# Patient Record
Sex: MALE | Employment: OTHER | URBAN - METROPOLITAN AREA
[De-identification: names, ages, dates, MRNs, and addresses within clinical notes are randomized per-mention and may not be internally consistent; named-entity substitution may affect disease eponyms.]

---

## 2019-03-06 ENCOUNTER — OFFICE VISIT (OUTPATIENT)
Dept: RHEUMATOLOGY | Age: 39
End: 2019-03-06

## 2019-03-06 VITALS
WEIGHT: 167 LBS | SYSTOLIC BLOOD PRESSURE: 149 MMHG | TEMPERATURE: 97.7 F | RESPIRATION RATE: 16 BRPM | HEART RATE: 73 BPM | OXYGEN SATURATION: 99 % | DIASTOLIC BLOOD PRESSURE: 82 MMHG

## 2019-03-06 DIAGNOSIS — L40.50 PSA (PSORIATIC ARTHRITIS) (HCC): Primary | ICD-10-CM

## 2019-03-06 RX ORDER — NAPROXEN SODIUM 220 MG
220 TABLET ORAL 2 TIMES DAILY WITH MEALS
COMMUNITY
End: 2020-04-06 | Stop reason: ALTCHOICE

## 2019-03-06 NOTE — PROGRESS NOTES
Chief Complaint   Patient presents with    Joint Pain     1. Have you been to the ER, urgent care clinic since your last visit? Hospitalized since your last visit? No New Patient    2. Have you seen or consulted any other health care providers outside of the [address] [place] [address] since your last visit? Include any pap smears or colon screening.  No new patient

## 2019-03-06 NOTE — PROGRESS NOTES
CHIEF COMPLAINT  The patient was seen for rheumatology consultation as self referral.    HISTORY OF PRESENT ILLNESS  This is a 34 y.o.  male. Today, the patient complains of pain in the joints. Location: shoulder, elbow, wrist, hand, knee, ankle, foot  Severity:  6 on a scale of 0-10  Timing: intermittent, on awakening or during sleep   Duration:  many years  Modifying factors: Enbrel  Context/Associated signs and symptoms: The patient was diagnosed with psoriatic arthritis in 1998. The disease began with scalp psoriasis and inflammation of the left foot described as dactylitis. The joint disease progressed in involve mulitple joints which include the fingers, elbows, shoulders, ankles and bilateral feet and knees. The patient was treated with methotrexate and sulfasalazine for 4 years and then was placed on enbrel in 2003. He took enbrel weekly for 2 years and then spaced it to every 3-4 weeks. He has never been in remission and spaced the medication himself. He continues to endorse joint pain, swelling, some fatigue, active rash and joint contracture. There were episodes of lower back pain in the past which has been absent.     RHEUMATOLOGY REVIEW OF SYSTEMS   Positives as per HPI  Negatives as follows:  Russel Mata:  Denies unexplained persistent fevers, weight change  HEAD/EYES:   Denies eye redness, blurry vision or sudden loss of vision, dry eyes, HA, temporal artery pain  ENT:    Denies oral/nasal ulcers, recurrent sinus infections, dry mouth  RESPIRATORY:  No pleuritic pain, history of pleural effusions, hemoptysis, exertional dyspnea  CARDIOVASCULAR:  Denies chest pain, history of pericardial effusions  GASTRO:   Denies heartburn, esophageal dysmotility, abdominal pain, nausea, vomiting, diarrhea, blood in the stool  HEMATOLOGIC:  No easy bruising, purpura, swollen lymph nodes  SKIN:    Denies alopecia, ulcers, nodules, sun sensitivity  VASCULAR:   Denies edema, cyanosis, raynaud phenomenon  NEUROLOGIC:  Denies specific muscle weakness, paresthesias   PSYCHIATRIC:  No sleep disturbance / snoring, depression, anxiety  MSK:    No morning stiffness >1 hour, SI joint pain    MEDICAL AND SOCIAL HISTORY  This was reviewed with the patient and reviewed in the medical records. Social History     Tobacco Use    Smoking status: Never Smoker    Smokeless tobacco: Never Used   Substance Use Topics    Alcohol use: No     Frequency: Never    Drug use: No     Employment - No history of exposure to asbestos or silica  Sleep - Good, no issues  Exercise - no    FAMILY HISTORY  Psoriasis - Father    MEDICATIONS  All the current medications were reviewed in detail. PHYSICAL EXAM  Blood pressure 149/82, pulse 73, temperature 97.7 °F (36.5 °C), temperature source Oral, resp. rate 16, weight 167 lb (75.8 kg), SpO2 99 %. GENERAL APPEARANCE: Well-nourished adult in no acute distress. EYES: No scleral erythema, conjunctival injection. ENT: No oral ulcer, parotid enlargement. NECK: No adenopathy, thyroid enlargement. CARDIOVASCULAR: Heart rhythm is regular. No murmur, rub, gallop. CHEST: Normal vesicular breath sounds. No wheezes, rales, pleural friction rubs. ABDOMINAL: The abdomen is soft and nontender. Liver and spleen are nonpalpable. Bowel sounds are normal.  EXTREMITIES: There is no evidence of clubbing, cyanosis, edema. SKIN: Psoriatic rash with plaques over scalp and groin (reported) - 3% BSA   NEUROLOGICAL: Normal gait and station, full strength in upper and lower extremities, normal sensation to light touch.   MUSCULOSKELETAL:   (Right/Left)  Hands: thumb dactylitis and tenderess/left 3rd digit joint contracture and left 3rd digit dactylitis  Elbows: NA/left elbow tenderess  Hips: Decreased ROM of the right/NA   Knees: right knee DROM/NA  Ankles: bilateral DROM on inversion and eversion with synovial thickening/  Feet: MTP decreased ROM with no tenderness     LABS, RADIOLOGY AND PROCEDURES  Previous labs reviewed -Yes  Previous radiology reviewed -Yes  Previous procedures reviewed -Yes  Previous medical records reviewed/summarized -Yes    ASSESSMENT  1. PsA (1998) - Polyarthritis, dactylitis, scalp and groin psoriasis, joint contracture, MTX/SZA ([id number]), Enbrel (2003-current, suboptimally treated with enbrel every 4 weeks) - The patient has active disease and chronic joint damage from untreated disease. We discussed the disease and long term adverse effects of untreated disease as well as other issues such as heart disease associated with unchecked inflammation from PsA. We will evaluate the joint with x-rays and check labs prior to start enbrel every week. If he responds to this he will remain on this and if he does not we will switch to a different biologic. The spine does not appear to be affected but we will image the SI joint. 2. New medication - enbrel - A written summary, as prepared by the [employer] of Rheumatology was provided. The patient was given the opportunity to ask questions, and verbalized understanding of the following: The most common side effect seen with the injectable drugs (etanercept) are skin reactions, commonly referred to as \"injection site reactions. \"  This can last up to 1 week. The most significant side effects of anti-TNF therapy are increased risk for all types of infections, including TB and fungal infections. Some of these infections may be severe. The patient is tested for TB and hepatitis prior to starting therapy. The medication should be stopped if there is high fever or if the patient is being treated with antibiotics for an infection. Long term anti-TNF agents may increase the risk of cancers such as lymphoma and skin cancer. There are rare neurologic complications from the use of these medications. People who have a history of multiple sclerosis should not use them.  People with significant heart failure should not be on anti-TNF therapy. Using these medications may make the vaccination less effective and live vaccines should not be given. PLAN  1. Start enbrel weekly   2. Check CBC, CMP, markers of inflammation (ESR, CRP)   3. Obtain labs for hepatitis and tuberculosis in anticipation of anti-TNF therapy / biologic therapy. 4. X-rays of hands, feet, knees to look for inflammatory/erosive changes   5. Sacroiliac x-ray to look for inflammation/sclerosis/erosive changes    Shaggy Grande MD  Adult and Pediatric Rheumatology     [facility] and [address] [facility] [place], [address] [place] [address], Phone [phone], Fax [phone]   E-mail: [email or web address]    Visiting  of Pediatrics    Department of Pediatrics, [facility] of [address] [place] [address], [address] [place] [address], Phone [phone], Fax [phone]  E-mail: [email or web address]

## 2019-03-10 LAB
ALBUMIN SERPL-MCNC: 4.5 G/DL (ref 3.5–5.5)
ALBUMIN/GLOB SERPL: 1.3 {RATIO} (ref 1.2–2.2)
ALP SERPL-CCNC: 85 IU/L (ref 39–117)
ALT SERPL-CCNC: 33 IU/L (ref 0–44)
ANA TITR SER IF: NEGATIVE {TITER}
AST SERPL-CCNC: 32 IU/L (ref 0–40)
BASOPHILS # BLD MANUAL: 0.1 X10E3/UL (ref 0–0.2)
BASOPHILS NFR BLD MANUAL: 1 %
BILIRUB SERPL-MCNC: 0.5 MG/DL (ref 0–1.2)
BUN SERPL-MCNC: 10 MG/DL (ref 6–24)
BUN/CREAT SERPL: 13 (ref 9–20)
CALCIUM SERPL-MCNC: 9.8 MG/DL (ref 8.7–10.2)
CHLORIDE SERPL-SCNC: 102 MMOL/L (ref 96–106)
CO2 SERPL-SCNC: 27 MMOL/L (ref 20–29)
COMMENT, 144067: NORMAL
CREAT SERPL-MCNC: 0.77 MG/DL (ref 0.76–1.27)
CRP SERPL-MCNC: 3.2 MG/L (ref 0–4.9)
DIFFERENTIAL COMMENT, 115260: ABNORMAL
EOSINOPHIL # BLD MANUAL: 0.5 X10E3/UL (ref 0–0.4)
EOSINOPHIL NFR BLD MANUAL: 8 %
ERYTHROCYTE [DISTWIDTH] IN BLOOD BY AUTOMATED COUNT: 14.6 % (ref 12.3–15.4)
ERYTHROCYTE [SEDIMENTATION RATE] IN BLOOD BY WESTERGREN METHOD: 17 MM/HR (ref 0–15)
GAMMA INTERFERON BACKGROUND BLD IA-ACNC: 0.02 IU/ML
GLOBULIN SER CALC-MCNC: 3.5 G/DL (ref 1.5–4.5)
GLUCOSE SERPL-MCNC: 68 MG/DL (ref 65–99)
HBV CORE AB SERPL QL IA: NEGATIVE
HBV CORE IGM SERPL QL IA: NEGATIVE
HBV E AB SERPL QL IA: NEGATIVE
HBV E AG SERPL QL IA: NEGATIVE
HBV SURFACE AB SER QL: NON REACTIVE
HBV SURFACE AG SERPL QL IA: NEGATIVE
HCT VFR BLD AUTO: 40.5 % (ref 37.5–51)
HCV AB S/CO SERPL IA: <0.1 S/CO RATIO (ref 0–0.9)
HGB BLD-MCNC: 13.4 G/DL (ref 13–17.7)
LYMPHOCYTES # BLD MANUAL: 2.2 X10E3/UL (ref 0.7–3.1)
LYMPHOCYTES NFR BLD MANUAL: 38 %
M TB IFN-G BLD-IMP: NEGATIVE
M TB IFN-G CD4+ BCKGRND COR BLD-ACNC: 0.04 IU/ML
MCH RBC QN AUTO: 27.4 PG (ref 26.6–33)
MCHC RBC AUTO-ENTMCNC: 33.1 G/DL (ref 31.5–35.7)
MCV RBC AUTO: 83 FL (ref 79–97)
MITOGEN IGNF BLD-ACNC: >10 IU/ML
MONOCYTES # BLD MANUAL: 0.5 X10E3/UL (ref 0.1–0.9)
MONOCYTES NFR BLD MANUAL: 8 %
NEUTROPHILS # BLD MANUAL: 2.6 X10E3/UL (ref 1.4–7)
NEUTROPHILS NFR BLD MANUAL: 45 %
PLATELET # BLD AUTO: 278 X10E3/UL (ref 150–379)
PLATELET BLD QL SMEAR: ADEQUATE
POTASSIUM SERPL-SCNC: 4.5 MMOL/L (ref 3.5–5.2)
PROT SERPL-MCNC: 8 G/DL (ref 6–8.5)
QUANTIFERON INCUBATION, QF1T: NORMAL
QUANTIFERON TB2 AG: 0.04 IU/ML
RBC # BLD AUTO: 4.89 X10E6/UL (ref 4.14–5.8)
RBC MORPH BLD: ABNORMAL
SERVICE CMNT-IMP: NORMAL
SODIUM SERPL-SCNC: 140 MMOL/L (ref 134–144)
WBC # BLD AUTO: 5.7 X10E3/UL (ref 3.4–10.8)

## 2019-03-12 RX ORDER — ETANERCEPT 50 MG/ML
50 SOLUTION SUBCUTANEOUS
Qty: 3.92 ML | Refills: 6 | Status: SHIPPED | OUTPATIENT
Start: 2019-03-12 | End: 2019-04-11

## 2019-05-15 ENCOUNTER — OFFICE VISIT (OUTPATIENT)
Dept: RHEUMATOLOGY | Age: 39
End: 2019-05-15

## 2019-05-15 VITALS
DIASTOLIC BLOOD PRESSURE: 84 MMHG | OXYGEN SATURATION: 96 % | TEMPERATURE: 98 F | SYSTOLIC BLOOD PRESSURE: 156 MMHG | WEIGHT: 175.6 LBS | RESPIRATION RATE: 16 BRPM | HEART RATE: 89 BPM

## 2019-05-15 DIAGNOSIS — L40.50 PSA (PSORIATIC ARTHRITIS) (HCC): Primary | ICD-10-CM

## 2019-05-15 NOTE — PROGRESS NOTES
Chief Complaint   Patient presents with    Joint Pain     1. Have you been to the ER, urgent care clinic since your last visit? Hospitalized since your last visit? No    2. Have you seen or consulted any other health care providers outside of the [address] [place] [address] since your last visit? Include any pap smears or colon screening.  No

## 2019-05-15 NOTE — PROGRESS NOTES
RHEUMATOLOGY PROBLEM LIST AND CHIEF COMPLAINT  1. PsA (1998) - Polyarthritis, dactylitis, scalp and groin psoriasis, joint contracture, MTX/SZA ([id number]), Enbrel (2003-current, suboptimally treated with enbrel every 4 weeks)     Therapy History:   Current DMARDs: Enbrel ([id number] every 4 weeks, weekly 3/2019-current)    HISTORY OF PRESENT ILLNESS  This is a 37 y.o.  male. Today, the patient complains of no pain in the joints. Location: none  Severity:  0 on a scale of 0-10  Timing: intermittent, on awakening or during sleep   Duration:  2 months   Modifying factors: Enbrel  Context/Associated signs and symptoms: The patient started Enbrel 50 mg weekly 2 months ago with no issues. He complains of psoriatic patches over his ears and groin, the lesions in his groin are itchy and he believes there is <1% BSA over his groin. He has tried cream of urea and salicylic acid, and a new medicated shampoo. All of these changes have improved the lesions. He has not taken Naproxen in the last 2 months.      RHEUMATOLOGY REVIEW OF SYSTEMS   Positives as per history  Negatives as follows:  Germán Phelps:  Denies unexplained persistent fevers, weight change, chronic fatigue  HEAD/EYES:   Denies eye redness, blurry vision or sudden loss of vision, dry eyes, HA, temporal artery pain  ENT:    Denies oral/nasal ulcers, recurrent sinus infections, dry mouth  RESPIRATORY:  No pleuritic pain, history of pleural effusions, hemoptysis, exertional dyspnea  CARDIOVASCULAR:  Denies chest pain, history of pericardial effusions  GASTRO:   Denies heartburn, esophageal dysmotility, abdominal pain, nausea, vomiting, diarrhea, blood in the stool  HEMATOLOGIC:  No easy bruising, purpura, swollen lymph nodes  SKIN:    Denies alopecia, ulcers, nodules, sun sensitivity, unexplained persistent rash   VASCULAR:   Denies edema, cyanosis, raynaud phenomenon  NEUROLOGIC:  Denies specific muscle weakness, paresthesias   PSYCHIATRIC:  No sleep disturbance / snoring, depression, anxiety  MSK:    No morning stiffness >1 hour, SI joint pain, persistent joint swelling, persistent joint pain    PAST MEDICAL HISTORY  Reviewed with patient, significant changes in medical history - no    PHYSICAL EXAM  Blood pressure 156/84, pulse 89, temperature 98 °F (36.7 °C), temperature source Oral, resp. rate 16, weight 175 lb 9.6 oz (79.7 kg), SpO2 96 %. GENERAL APPEARANCE: Well-nourished adult in no acute distress. EYES: No scleral erythema, conjunctival injection. ENT: No oral ulcer, parotid enlargement. NECK: No adenopathy, thyroid enlargement. CARDIOVASCULAR: Heart rhythm is regular. No murmur, rub, gallop. CHEST: Normal vesicular breath sounds. No wheezes, rales, pleural friction rubs. ABDOMINAL: The abdomen is soft and nontender. Liver and spleen are nonpalpable. Bowel sounds are normal.  EXTREMITIES: There is no evidence of clubbing, cyanosis, edema. SKIN: Psoriatic rash with plaques over ear and groin (reported) <1% BSA   NEUROLOGICAL: Normal gait and station, full strength in upper and lower extremities, normal sensation to light touch. MUSCULOSKELETAL:   (Right/Left)  Hands: thumb dactylitis (present, but now a chronic change); R 1st digit mild swelling & tenderness. Left 3rd digit joint contracture and left 3rd digit dactylitis  Elbows: NA/NA  Hips: Decreased ROM of the right/NA   Knees: right knee DROM/NA  Ankles: bilateral DROM on inversion and eversion with synovial thickening (improved)  Feet: MTP decreased ROM with no tenderness (improved)    LABS, RADIOLOGY AND PROCEDURES  Previous labs reviewed -Yes  Previous radiology reviewed -Yes    03/06/2019 XR FOOT RT MIN 3 V  IMPRESSION:   1. Findings compatible with psoriatic arthritis with pencil in cup erosive  change of the second through fifth MTP joints and multiple joint ankylosis. 03/06/2019 XR FOOT LT MIN 3 V  IMPRESSION:   1.  Pencil in cup deformities and ankylosis of multiple joints as detailed above  compatible with psoriatic arthritis. Previous procedures reviewed -Yes  Previous medical records reviewed/summarized -Yes    ASSESSMENT  1. PsA (1998) - (Established problem -  Very good partial response) - The patient has responded well to Enbrel weekly. His joints and psoriasis have improved on exam. He should continue with Enbrel 50 mg weekly & Naproxen PRN. 2. Drug therapy monitoring for toxicity (biologic) - CBC, BUN, Cr, AST, ALT and albumin every 4 months    PLAN  1. Enbrel 50 mg weekly  2. Naproxen PRN  3. Return in 3 months    Shaggy Horvath MD  Adult and Pediatric Rheumatology     [address], Phone [phone], Fax [phone]   E-mail: [email or web address]    Visiting  of Pediatrics    Department of Pediatrics, [facility] of [address] [place] [address], [address] [place] [address], Phone [phone], Fax [phone]  E-mail: [email or web address]     There are no Patient Instructions on file for this visit. cc:  Addie Sanders MD    Written by torrey Bryan, as dictated by Priscila Romo.  Fernando Horvath M.D.

## 2019-12-02 ENCOUNTER — OFFICE VISIT (OUTPATIENT)
Dept: RHEUMATOLOGY | Age: 39
End: 2019-12-02

## 2019-12-02 VITALS
OXYGEN SATURATION: 98 % | SYSTOLIC BLOOD PRESSURE: 163 MMHG | RESPIRATION RATE: 16 BRPM | HEART RATE: 75 BPM | DIASTOLIC BLOOD PRESSURE: 97 MMHG | TEMPERATURE: 97.8 F | WEIGHT: 184 LBS

## 2019-12-02 DIAGNOSIS — L40.50 PSA (PSORIATIC ARTHRITIS) (HCC): Primary | ICD-10-CM

## 2019-12-02 RX ORDER — PREDNISONE 1 MG/1
4 TABLET ORAL DAILY
Qty: 120 TAB | Refills: 1 | Status: SHIPPED | OUTPATIENT
Start: 2019-12-02 | End: 2020-04-06 | Stop reason: ALTCHOICE

## 2019-12-02 NOTE — PROGRESS NOTES
RHEUMATOLOGY PROBLEM LIST AND CHIEF COMPLAINT  1. PsA (1998) - Polyarthritis, dactylitis, scalp and groin psoriasis, joint contracture, MTX/SZA ([id number]), Enbrel (2003-current, suboptimally treated with enbrel every 4 weeks)     Therapy History:   Current DMARDs: Enbrel ([id number] every 4 weeks, weekly 3/2019-current, non adherent to weekly medication)    HISTORY OF PRESENT ILLNESS  This is a 37 y.o.  male. Today, the patient complains of no pain in the joints. Location: none  Severity:  0 on a scale of 0-10  Timing: intermittent, on awakening or during sleep   Duration:  2 months   Modifying factors: Enbrel  Context/Associated signs and symptoms: Has been doing well overall since the last visit. The patient continues with Enbrel. Reports that he missed Enbrel two weeks ago but has been taking the medication consistently since then. Not taking Naproxen.      RHEUMATOLOGY REVIEW OF SYSTEMS   Positives as per history  Negatives as follows:  Epifanio Garibay:  Denies unexplained persistent fevers, weight change, chronic fatigue  HEAD/EYES:   Denies eye redness, blurry vision or sudden loss of vision, dry eyes, HA, temporal artery pain  ENT:    Denies oral/nasal ulcers, recurrent sinus infections, dry mouth  RESPIRATORY:  No pleuritic pain, history of pleural effusions, hemoptysis, exertional dyspnea  CARDIOVASCULAR:  Denies chest pain, history of pericardial effusions  GASTRO:   Denies heartburn, esophageal dysmotility, abdominal pain, nausea, vomiting, diarrhea, blood in the stool  HEMATOLOGIC:  No easy bruising, purpura, swollen lymph nodes  SKIN:    Denies alopecia, ulcers, nodules, sun sensitivity, unexplained persistent rash   VASCULAR:   Denies edema, cyanosis, raynaud phenomenon  NEUROLOGIC:  Denies specific muscle weakness, paresthesias   PSYCHIATRIC:  No sleep disturbance / snoring, depression, anxiety  MSK:    No morning stiffness >1 hour, SI joint pain, persistent joint swelling, persistent joint pain    PAST MEDICAL HISTORY  Reviewed with patient, significant changes in medical history - no    PHYSICAL EXAM  Blood pressure (!) 163/97, pulse 75, temperature 97.8 °F (36.6 °C), temperature source Oral, resp. rate 16, weight 184 lb (83.5 kg), SpO2 98 %. GENERAL APPEARANCE: Well-nourished adult in no acute distress. EYES: No scleral erythema, conjunctival injection. ENT: No oral ulcer, parotid enlargement. NECK: No adenopathy, thyroid enlargement. CARDIOVASCULAR: Heart rhythm is regular. No murmur, rub, gallop. CHEST: Normal vesicular breath sounds. No wheezes, rales, pleural friction rubs. ABDOMINAL: The abdomen is soft and nontender. Liver and spleen are nonpalpable. Bowel sounds are normal.  EXTREMITIES: There is no evidence of clubbing, cyanosis, edema. SKIN: Psoriatic rash with plaques over ear and groin (reported) <1% BSA   NEUROLOGICAL: Normal gait and station, full strength in upper and lower extremities, normal sensation to light touch. MUSCULOSKELETAL:   (Right/Left)  Hands: thumb dactylitis (present, but now a chronic change); R 1st digit mild swelling & tenderness. Left 3rd digit joint contracture and left 3rd digit dactylitis. Left 2nd joint contracture, fusion of the left 2nd DIP. Elbows: no synovitis   Hips: Decreased ROM of the right/NA   Knees: right knee DROM/NA  Ankles: bilateral DROM on inversion and eversion with synovial thickening (improved). Left side has synovial thickening, worse than the right side. Feet: MTP decreased ROM with no tenderness (improved)    LABS, RADIOLOGY AND PROCEDURES  Previous labs reviewed -Yes  Previous radiology reviewed -Yes    03/06/2019 XR FOOT RT MIN 3 V  IMPRESSION:   1. Findings compatible with psoriatic arthritis with pencil in cup erosive  change of the second through fifth MTP joints and multiple joint ankylosis. 03/06/2019 XR FOOT LT MIN 3 V  IMPRESSION:   1.  Pencil in cup deformities and ankylosis of multiple joints as detailed above  compatible with psoriatic arthritis. Previous procedures reviewed -Yes  Previous medical records reviewed/summarized -Yes    ASSESSMENT  1. PsA (1998) - (Established problem -  Very good partial response) - The patient has responded well to Enbrel and previous symptoms have decreased. He has active disease due to the fact that he misses his medication at times. As of now, there is no need to escalate to any further measures. 2. Drug therapy monitoring for toxicity (biologic) - CBC, BUN, Cr, AST, ALT and albumin every 4 months    PLAN  1. Enbrel 50 mg weekly  2. Naproxen PRN  3. Obtain labs for hepatitis and tuberculosis in anticipation of anti-TNF therapy / biologic therapy. 4. Return in 3 months    Shaggy Perez MD  Adult and Pediatric Rheumatology     [address] [place] [address], Phone [phone], Fax [phone]   E-mail: [email or web address]    Visiting  of Pediatrics    Department of Pediatrics, [facility] of [address] [place] [address], [address] [place] [address], Phone [phone], Fax [phone]  E-mail: [email or web address]     There are no Patient Instructions on file for this visit. cc:  Brittni Levi MD    Written by torrey Cason, as dictated by Elizabeth Altamirano.  Audi Perez M.D.

## 2019-12-02 NOTE — PROGRESS NOTES
Chief Complaint   Patient presents with    Joint Pain     1. Have you been to the ER, urgent care clinic since your last visit? Hospitalized since your last visit? No    2. Have you seen or consulted any other health care providers outside of the [address] [place] [address] since your last visit? Include any pap smears or colon screening.  No

## 2019-12-05 LAB
ALBUMIN SERPL-MCNC: 4.4 G/DL (ref 3.5–5.5)
ALBUMIN/GLOB SERPL: 1.3 {RATIO} (ref 1.2–2.2)
ALP SERPL-CCNC: 82 IU/L (ref 39–117)
ALT SERPL-CCNC: 62 IU/L (ref 0–44)
AST SERPL-CCNC: 50 IU/L (ref 0–40)
BASOPHILS # BLD MANUAL: 0.1 X10E3/UL (ref 0–0.2)
BASOPHILS NFR BLD MANUAL: 1 %
BILIRUB SERPL-MCNC: 0.8 MG/DL (ref 0–1.2)
BUN SERPL-MCNC: 7 MG/DL (ref 6–24)
BUN/CREAT SERPL: 8 (ref 9–20)
CALCIUM SERPL-MCNC: 9.2 MG/DL (ref 8.7–10.2)
CHLORIDE SERPL-SCNC: 98 MMOL/L (ref 96–106)
CO2 SERPL-SCNC: 23 MMOL/L (ref 20–29)
COMMENT, 144067: NORMAL
CREAT SERPL-MCNC: 0.83 MG/DL (ref 0.76–1.27)
CRP SERPL-MCNC: 1 MG/L (ref 0–10)
DIFFERENTIAL COMMENT, 115260: ABNORMAL
EOSINOPHIL # BLD MANUAL: 0.2 X10E3/UL (ref 0–0.4)
EOSINOPHIL NFR BLD MANUAL: 3 %
ERYTHROCYTE [DISTWIDTH] IN BLOOD BY AUTOMATED COUNT: 12.3 % (ref 12.3–15.4)
ERYTHROCYTE [SEDIMENTATION RATE] IN BLOOD BY WESTERGREN METHOD: 13 MM/HR (ref 0–15)
GAMMA INTERFERON BACKGROUND BLD IA-ACNC: 0.06 IU/ML
GLOBULIN SER CALC-MCNC: 3.3 G/DL (ref 1.5–4.5)
GLUCOSE SERPL-MCNC: 132 MG/DL (ref 65–99)
HBV CORE AB SERPL QL IA: NEGATIVE
HBV CORE IGM SERPL QL IA: NEGATIVE
HBV E AB SERPL QL IA: NEGATIVE
HBV E AG SERPL QL IA: NEGATIVE
HBV SURFACE AB SER QL: NON REACTIVE
HBV SURFACE AG SERPL QL IA: NEGATIVE
HCT VFR BLD AUTO: 37.9 % (ref 37.5–51)
HCV AB S/CO SERPL IA: <0.1 S/CO RATIO (ref 0–0.9)
HGB BLD-MCNC: 12.5 G/DL (ref 13–17.7)
LYMPHOCYTES # BLD MANUAL: 1.6 X10E3/UL (ref 0.7–3.1)
LYMPHOCYTES NFR BLD MANUAL: 28 %
M TB IFN-G BLD-IMP: NEGATIVE
M TB IFN-G CD4+ BCKGRND COR BLD-ACNC: 0.08 IU/ML
MCH RBC QN AUTO: 27.4 PG (ref 26.6–33)
MCHC RBC AUTO-ENTMCNC: 33 G/DL (ref 31.5–35.7)
MCV RBC AUTO: 83 FL (ref 79–97)
MITOGEN IGNF BLD-ACNC: >10 IU/ML
MONOCYTES # BLD MANUAL: 0.4 X10E3/UL (ref 0.1–0.9)
MONOCYTES NFR BLD MANUAL: 7 %
MORPHOLOGY BLD-IMP: ABNORMAL
NEUTROPHILS # BLD MANUAL: 3.4 X10E3/UL (ref 1.4–7)
NEUTROPHILS NFR BLD MANUAL: 61 %
PLATELET # BLD AUTO: 209 X10E3/UL (ref 150–450)
PLATELET BLD QL SMEAR: ADEQUATE
POTASSIUM SERPL-SCNC: 4.1 MMOL/L (ref 3.5–5.2)
PROT SERPL-MCNC: 7.7 G/DL (ref 6–8.5)
QUANTIFERON INCUBATION, QF1T: NORMAL
QUANTIFERON TB2 AG: 0.09 IU/ML
RBC # BLD AUTO: 4.57 X10E6/UL (ref 4.14–5.8)
RBC MORPH BLD: ABNORMAL
SERVICE CMNT-IMP: NORMAL
SODIUM SERPL-SCNC: 141 MMOL/L (ref 134–144)
WBC # BLD AUTO: 5.6 X10E3/UL (ref 3.4–10.8)

## 2019-12-26 RX ORDER — ETANERCEPT 50 MG/ML
SOLUTION SUBCUTANEOUS
Qty: 4 EACH | Refills: 0 | Status: SHIPPED | OUTPATIENT
Start: 2019-12-26 | End: 2020-04-06 | Stop reason: SDUPTHER

## 2020-03-18 RX ORDER — ETANERCEPT 50 MG/ML
SOLUTION SUBCUTANEOUS
Qty: 4 EACH | Refills: 0 | OUTPATIENT
Start: 2020-03-18

## 2020-04-06 ENCOUNTER — VIRTUAL VISIT (OUTPATIENT)
Dept: RHEUMATOLOGY | Age: 40
End: 2020-04-06

## 2020-04-06 DIAGNOSIS — L40.50 PSA (PSORIATIC ARTHRITIS) (HCC): Primary | ICD-10-CM

## 2020-04-06 RX ORDER — ETANERCEPT 50 MG/ML
SOLUTION SUBCUTANEOUS
Qty: 4 EACH | Refills: 4 | Status: SHIPPED | OUTPATIENT
Start: 2020-04-06 | End: 2020-10-27

## 2020-04-06 NOTE — PROGRESS NOTES
Due to the recent COVID19 outbreak, this patient's appointment today was converted to a telephone/video visit. Current outbreak of COVID19- we discussed the current CDC recommendations of practicing social distancing, only going out for needed trips to the store/pharmacy and avoiding groups of 10 or more. Discussed that people with chronic disease, advanced age and immunosuppressive medications are likely at increased risk of severe disease if they were to contract the virus. At this time, we are not recommending stopping these medications in asymptomatic people. We reviewed the previous plan from the last visit. Below is a synopsis of what was covered during the phone/video call. Current rheumatology medications: Enbrel 50 mg weekly      RHEUMATOLOGY PROBLEM LIST AND CHIEF COMPLAINT  1. PsA (1998) - Polyarthritis, dactylitis, scalp and groin psoriasis, joint contracture, MTX/SZA ([id number]), Enbrel (2003-current, suboptimally treated with enbrel every 4 weeks)     Therapy History:   Current DMARDs: Enbrel ([id number] every 4 weeks, weekly 3/2019-current, non adherent to weekly medication)    TB  (Q-gold) test: 12/2019 - negative      HISTORY OF PRESENT ILLNESS  This is a 41 y.o.  male. Today, the patient complains of no pain in the joints. Location: none  Timing: intermittent  Duration:  4 months   Modifying factors: Enbrel  Context/Associated signs and symptoms: The patient reports he is doing well today. He remains on Enbrel 50 mg weekly. He reports he ran out of the medication and requires a prior authorization. Today, he reports his rash is less than 1% he has minimal lesions on the groin and the rash behind the ears has resolved. He reports his hands, hips, knees, and toes have remained stable, he has no further tenderness today. No morning stiffness reported today. The patient denies new medications or medical issues.        RHEUMATOLOGY REVIEW OF SYSTEMS   Positives as per history  Negatives as follows:  CONSTITUTlONAL:  Denies unexplained persistent fevers, weight change, chronic fatigue  HEAD/EYES:   Denies eye redness, blurry vision or sudden loss of vision, dry eyes, HA, temporal artery pain  ENT:    Denies oral/nasal ulcers, recurrent sinus infections, dry mouth  RESPIRATORY:  No pleuritic pain, history of pleural effusions, hemoptysis, exertional dyspnea  CARDIOVASCULAR:  Denies chest pain, history of pericardial effusions  GASTRO:   Denies heartburn, esophageal dysmotility, abdominal pain, nausea, vomiting, diarrhea, blood in the stool  HEMATOLOGIC:  No easy bruising, purpura, swollen lymph nodes  SKIN:    Denies alopecia, ulcers, nodules, sun sensitivity, unexplained persistent rash   VASCULAR:   Denies edema, cyanosis, raynaud phenomenon  NEUROLOGIC:  Denies specific muscle weakness, paresthesias   PSYCHIATRIC:  No sleep disturbance / snoring, depression, anxiety  MSK:    No morning stiffness >1 hour, SI joint pain, persistent joint swelling, persistent joint pain    PAST MEDICAL HISTORY  Reviewed with patient, significant changes in medical history - no    PHYSICAL EXAM  GENERAL APPEARANCE: Well-nourished adult in no acute distress. (Right/Left)  Hands: thumb dactylitis (present, but now a chronic change); R 1st digit mild swelling. Left 3rd digit joint contracture and left 3rd digit dactylitis. Left 2nd joint contracture, fusion of the left 2nd DIP. Elbows: no synovitis   Hips: Decreased ROM of the right/NA   Knees: right knee DROM/NA  Ankles: bilateral DROM on inversion and eversion with synovial thickening (improved). Left side has synovial thickening, worse than the right side. Feet: MTP decreased ROM with no tenderness (improved)    LABS, RADIOLOGY AND PROCEDURES  Previous labs reviewed -Yes  Previous radiology reviewed -Yes    03/06/2019 XR FOOT RT MIN 3 V  IMPRESSION:   1.  Findings compatible with psoriatic arthritis with pencil in cup erosive  change of the second through fifth MTP joints and multiple joint ankylosis. 03/06/2019 XR FOOT LT MIN 3 V  IMPRESSION:   1. Pencil in cup deformities and ankylosis of multiple joints as detailed above  compatible with psoriatic arthritis. Previous procedures reviewed -Yes  Previous medical records reviewed/summarized -Yes    ASSESSMENT  1. PsA (1998) - (Established problem -  Very good partial response) - The patient is stable on Enbrel 50 mg weekly. I advised the patient to continue on this medication unless he develops fever or shortness of breath. For mild disease, social isolation is recommended. However if he develops shortness or breath he may need to be further evaluated. No labs are needed today. I will order labs to be completed in May/June. He should return in 4 months. 2. Drug therapy monitoring for toxicity (biologic) - CBC, BUN, Cr, AST, ALT and albumin every 4 months    PLAN  1. Enbrel 50 mg weekly  2. Naproxen PRN  3. Check CBC, CMP  4. Return in 4 months    Shaggy Babin MD  Adult and Pediatric Rheumatology     [facility], [address] [place] [address], Phone [phone], Fax [phone]     Visiting  of Pediatrics    Department of Pediatrics, [facility] of [address] [place] [address], [address] [place] [address], Phone [phone], Fax [phone]    There are no Patient Instructions on file for this visit. cc:  Elaine Last MD    Written by torrey Kohli, as dictated by Dr. Bisi Zurita M.D. Total face-to face time was 25 minutes, greater than 50% of which was spent in counseling and coordination of care. The diagnosis, treatment and various other items were discussed in detail: Test results, medication options, possible side effects, lifestyle changes.

## 2020-11-06 ENCOUNTER — OFFICE VISIT (OUTPATIENT)
Dept: RHEUMATOLOGY | Age: 40
End: 2020-11-06
Payer: COMMERCIAL

## 2020-11-06 VITALS
DIASTOLIC BLOOD PRESSURE: 102 MMHG | WEIGHT: 179.8 LBS | OXYGEN SATURATION: 99 % | HEART RATE: 70 BPM | SYSTOLIC BLOOD PRESSURE: 156 MMHG | TEMPERATURE: 97.5 F | RESPIRATION RATE: 16 BRPM

## 2020-11-06 DIAGNOSIS — L40.50 PSA (PSORIATIC ARTHRITIS) (HCC): Primary | ICD-10-CM

## 2020-11-06 PROCEDURE — 99214 OFFICE O/P EST MOD 30 MIN: CPT | Performed by: PEDIATRICS

## 2020-11-06 NOTE — PROGRESS NOTES
RHEUMATOLOGY PROBLEM LIST AND CHIEF COMPLAINT  1. PsA (1998) - Polyarthritis, dactylitis, scalp and groin psoriasis, joint contracture, MTX/SZA ([id number]), Enbrel (2003-current, suboptimally treated with enbrel every 4 weeks)     Therapy History:   Current DMARDs: Enbrel ([id number] every 4 weeks, weekly 3/2019-current, non adherent to weekly medication)    TB  (Q-gold) test: 12/2019 - negative    INTERVAL HISTORY  This is a 41 y.o.  male. Today, the patient complains of no pain in the joints. Location: none  Timing: intermittent  Duration:  4 months   Modifying factors: Enbrel  Context/Associated signs and symptoms: The patient reports doing well today. He continues on Enbrel 50 mg weekly. He continues to have minimal psoriatic rash over his ears. Notes development of discomfort in his bilateral PIPs when he missed a dose of Enbrel 50 mg weekly. He mentions that his previous knee discomfort has resolved. Patient reports an incidence of restless leg type symptoms in his right arm and hand lasting about one week occurring one month ago. He notes this sensation has not recurred. Denies associated numbness or tingling. September 2020 labs reviewed included CMP with mildly elevated ALT (50).       RHEUMATOLOGY REVIEW OF SYSTEMS   Positives as per history  Negatives as follows:  Holly Weller:  Denies unexplained persistent fevers, weight change, chronic fatigue  HEAD/EYES:   Denies eye redness, blurry vision or sudden loss of vision, dry eyes, HA, temporal artery pain  ENT:    Denies oral/nasal ulcers, recurrent sinus infections, dry mouth  RESPIRATORY:  No pleuritic pain, history of pleural effusions, hemoptysis, exertional dyspnea  CARDIOVASCULAR:  Denies chest pain, history of pericardial effusions  GASTRO:   Denies heartburn, esophageal dysmotility, abdominal pain, nausea, vomiting, diarrhea, blood in the stool  HEMATOLOGIC:  No easy bruising, purpura, swollen lymph nodes  SKIN:    Denies alopecia, ulcers, nodules, sun sensitivity, unexplained persistent rash   VASCULAR:   Denies edema, cyanosis, raynaud phenomenon  NEUROLOGIC:  Denies specific muscle weakness, paresthesias   PSYCHIATRIC:  No sleep disturbance / snoring, depression, anxiety  MSK:    No morning stiffness >1 hour, SI joint pain, persistent joint swelling, persistent joint pain    PAST MEDICAL HISTORY  Reviewed with patient, significant changes in medical history - no    PHYSICAL EXAM  Blood pressure (!) 156/102, pulse 70, temperature 97.5 °F (36.4 °C), temperature source Temporal, resp. rate 16, weight 179 lb 12.8 oz (81.6 kg), SpO2 99 %. GENERAL APPEARANCE: Well-nourished, no acute distress  EYES: No scleral erythema, conjunctival injection  ENT: No oral ulcer, parotid enlargement  NECK: No adenopathy, thyroid enlargement  CARDIOVASCULAR: Heart rhythm is regular. No murmur, rub, gallop  CHEST: Normal vesicular breath sounds. No wheezes, rales, pleural friction rubs  ABDOMINAL: The abdomen is soft and nontender. Bowel sounds are normal  EXTREMITIES: There is no evidence of clubbing, cyanosis, edema  SKIN: No rash, palpable purpura, digital ulcer, abnormal thickening, normal nailfold capillaries   NEUROLOGICAL: Normal gait and station, full strength in upper and lower extremities,  normal sensation to light touch  MUSCULOSKELETAL:   (Right/Left)  Hands: thumb dactylitis (present, but now a chronic change); Left 3rd digit joint contracture. Left 2nd joint contracture, fusion of the left 2nd DIP. Elbows: no synovitis   Hips: Decreased ROM of the right/NA   Knees: right knee dROM/NA  Ankles: bilateral DROM on inversion and eversion with synovial thickening (improved). Left side has synovial thickening, worse than the right side. Feet: MTP decreased ROM with no tenderness (improved)    LABS, RADIOLOGY AND PROCEDURES  Previous labs reviewed -Yes  Previous radiology reviewed -Yes    03/06/2019 XR FOOT RT MIN 3 V  IMPRESSION:   1.  Findings compatible with psoriatic arthritis with pencil in cup erosive  change of the second through fifth MTP joints and multiple joint ankylosis. 03/06/2019 XR FOOT LT MIN 3 V  IMPRESSION:   1. Pencil in cup deformities and ankylosis of multiple joints as detailed above  compatible with psoriatic arthritis. Previous procedures reviewed -Yes  Previous medical records reviewed/summarized -Yes    ASSESSMENT  1. PsA (1998) - Stable - The patient remains stable on Enbrel 50 mg weekly. He should continue this medication. I encouraged compliance with Enbrel to prevent development of any symptoms that could cause permanent damage. In regards to his arm symptoms I recommend he follow up with his primary care provider or consider neurology evaluation if this recurs or worsens. I will order labs to be completed in two months. If liver tests remain elevated, we will plan to seek further evaluation with imaging of his liver. He should return in 4-5 months for follow up. 2. Drug therapy monitoring for toxicity (biologic) - CBC, BUN, Cr, AST, ALT and albumin every 4 months    PLAN  1. Enbrel 50 mg weekly  2. Check CBC, CMP    3. Return in 4-5 months    Shaggy Person MD  Adult and Pediatric Rheumatology     [facility], [address] [place] [address], Phone [phone], Fax [phone]     Visiting  of Pediatrics    Department of Pediatrics, [facility] of [address] [place] [address], [address] [place] [address], Phone [phone], Fax [phone]    There are no Patient Instructions on file for this visit.     cc:  Karishma Jimenez MD    Written by torrey Rosales, as dictated by Dr. Gerhard Wolf M.D.

## 2020-11-06 NOTE — PROGRESS NOTES
Chief Complaint   Patient presents with    Joint Pain     1. Have you been to the ER, urgent care clinic since your last visit? Hospitalized since your last visit? No    2. Have you seen or consulted any other health care providers outside of the [address] [place] [address] since your last visit? Include any pap smears or colon screening.  No

## 2020-12-10 LAB
ALBUMIN SERPL-MCNC: 4.3 G/DL (ref 4–5)
ALBUMIN/GLOB SERPL: 1.4 {RATIO} (ref 1.2–2.2)
ALP SERPL-CCNC: 92 IU/L (ref 39–117)
ALT SERPL-CCNC: 53 IU/L (ref 0–44)
AST SERPL-CCNC: 61 IU/L (ref 0–40)
BASOPHILS # BLD AUTO: 0.1 X10E3/UL (ref 0–0.2)
BASOPHILS NFR BLD AUTO: 1 %
BILIRUB SERPL-MCNC: 0.9 MG/DL (ref 0–1.2)
BUN SERPL-MCNC: 8 MG/DL (ref 6–24)
BUN/CREAT SERPL: 9 (ref 9–20)
CALCIUM SERPL-MCNC: 9.4 MG/DL (ref 8.7–10.2)
CHLORIDE SERPL-SCNC: 103 MMOL/L (ref 96–106)
CO2 SERPL-SCNC: 26 MMOL/L (ref 20–29)
CREAT SERPL-MCNC: 0.94 MG/DL (ref 0.76–1.27)
EOSINOPHIL # BLD AUTO: 0.1 X10E3/UL (ref 0–0.4)
EOSINOPHIL NFR BLD AUTO: 2 %
ERYTHROCYTE [DISTWIDTH] IN BLOOD BY AUTOMATED COUNT: 13.9 % (ref 11.6–15.4)
GLOBULIN SER CALC-MCNC: 3.1 G/DL (ref 1.5–4.5)
GLUCOSE SERPL-MCNC: 197 MG/DL (ref 65–99)
HCT VFR BLD AUTO: 37.7 % (ref 37.5–51)
HGB BLD-MCNC: 12.1 G/DL (ref 13–17.7)
IMM GRANULOCYTES # BLD AUTO: 0 X10E3/UL (ref 0–0.1)
IMM GRANULOCYTES NFR BLD AUTO: 0 %
LYMPHOCYTES # BLD AUTO: 1.9 X10E3/UL (ref 0.7–3.1)
LYMPHOCYTES NFR BLD AUTO: 42 %
MCH RBC QN AUTO: 25.4 PG (ref 26.6–33)
MCHC RBC AUTO-ENTMCNC: 32.1 G/DL (ref 31.5–35.7)
MCV RBC AUTO: 79 FL (ref 79–97)
MONOCYTES # BLD AUTO: 0.3 X10E3/UL (ref 0.1–0.9)
MONOCYTES NFR BLD AUTO: 7 %
NEUTROPHILS # BLD AUTO: 2.2 X10E3/UL (ref 1.4–7)
NEUTROPHILS NFR BLD AUTO: 48 %
PLATELET # BLD AUTO: 206 X10E3/UL (ref 150–450)
POTASSIUM SERPL-SCNC: 4.7 MMOL/L (ref 3.5–5.2)
PROT SERPL-MCNC: 7.4 G/DL (ref 6–8.5)
RBC # BLD AUTO: 4.76 X10E6/UL (ref 4.14–5.8)
SODIUM SERPL-SCNC: 140 MMOL/L (ref 134–144)
WBC # BLD AUTO: 4.6 X10E3/UL (ref 3.4–10.8)

## 2021-03-26 RX ORDER — ETANERCEPT 50 MG/ML
SOLUTION SUBCUTANEOUS
Qty: 4 EACH | Refills: 0 | Status: SHIPPED | OUTPATIENT
Start: 2021-03-26 | End: 2021-04-01 | Stop reason: SDUPTHER

## 2021-03-31 ENCOUNTER — TRANSCRIBE ORDER (OUTPATIENT)
Dept: INTERNAL MEDICINE CLINIC | Age: 41
End: 2021-03-31

## 2021-03-31 RX ORDER — ETANERCEPT 50 MG/ML
SOLUTION SUBCUTANEOUS
Qty: 4 EACH | Refills: 0 | OUTPATIENT
Start: 2021-03-31

## 2021-04-02 DIAGNOSIS — L40.50 PSA (PSORIATIC ARTHRITIS) (HCC): Primary | ICD-10-CM

## 2021-04-15 RX ORDER — ETANERCEPT 50 MG/ML
SOLUTION SUBCUTANEOUS
Qty: 4 EACH | Refills: 0 | Status: SHIPPED | OUTPATIENT
Start: 2021-04-15 | End: 2021-05-04

## 2021-04-16 LAB
ALBUMIN SERPL-MCNC: 4.2 G/DL (ref 4–5)
ALBUMIN/GLOB SERPL: 1.2 {RATIO} (ref 1.2–2.2)
ALP SERPL-CCNC: 105 IU/L (ref 39–117)
ALT SERPL-CCNC: 70 IU/L (ref 0–44)
AST SERPL-CCNC: 58 IU/L (ref 0–40)
BASOPHILS # BLD MANUAL: 0 X10E3/UL (ref 0–0.2)
BASOPHILS NFR BLD MANUAL: 1 %
BILIRUB SERPL-MCNC: 0.7 MG/DL (ref 0–1.2)
BUN SERPL-MCNC: 7 MG/DL (ref 6–24)
BUN/CREAT SERPL: 8 (ref 9–20)
CALCIUM SERPL-MCNC: 9.6 MG/DL (ref 8.7–10.2)
CHLORIDE SERPL-SCNC: 99 MMOL/L (ref 96–106)
CO2 SERPL-SCNC: 25 MMOL/L (ref 20–29)
COMMENT, 144067: NORMAL
CREAT SERPL-MCNC: 0.89 MG/DL (ref 0.76–1.27)
DIFFERENTIAL COMMENT, 115260: ABNORMAL
EOSINOPHIL # BLD MANUAL: 0.3 X10E3/UL (ref 0–0.4)
EOSINOPHIL NFR BLD MANUAL: 6 %
ERYTHROCYTE [DISTWIDTH] IN BLOOD BY AUTOMATED COUNT: 14.2 % (ref 11.6–15.4)
GAMMA INTERFERON BACKGROUND BLD IA-ACNC: 0.02 IU/ML
GLOBULIN SER CALC-MCNC: 3.5 G/DL (ref 1.5–4.5)
GLUCOSE SERPL-MCNC: 256 MG/DL (ref 65–99)
HBV CORE AB SERPL QL IA: NEGATIVE
HBV CORE IGM SERPL QL IA: NEGATIVE
HBV E AB SERPL QL IA: NEGATIVE
HBV E AG SERPL QL IA: NEGATIVE
HBV SURFACE AB SER QL: NON REACTIVE
HBV SURFACE AG SERPL QL IA: NEGATIVE
HCT VFR BLD AUTO: 39.9 % (ref 37.5–51)
HCV AB S/CO SERPL IA: 0.1 S/CO RATIO (ref 0–0.9)
HGB BLD-MCNC: 13.3 G/DL (ref 13–17.7)
LYMPHOCYTES # BLD MANUAL: 1.8 X10E3/UL (ref 0.7–3.1)
LYMPHOCYTES NFR BLD MANUAL: 39 %
M TB IFN-G BLD-IMP: NEGATIVE
M TB IFN-G CD4+ BCKGRND COR BLD-ACNC: 0.05 IU/ML
MCH RBC QN AUTO: 26.4 PG (ref 26.6–33)
MCHC RBC AUTO-ENTMCNC: 33.3 G/DL (ref 31.5–35.7)
MCV RBC AUTO: 79 FL (ref 79–97)
MITOGEN IGNF BLD-ACNC: >10 IU/ML
MONOCYTES # BLD MANUAL: 0.4 X10E3/UL (ref 0.1–0.9)
MONOCYTES NFR BLD MANUAL: 8 %
NEUTROPHILS # BLD MANUAL: 2.2 X10E3/UL (ref 1.4–7)
NEUTROPHILS NFR BLD MANUAL: 46 %
PLATELET # BLD AUTO: 215 X10E3/UL (ref 150–450)
PLATELET BLD QL SMEAR: ADEQUATE
POTASSIUM SERPL-SCNC: 4.5 MMOL/L (ref 3.5–5.2)
PROT SERPL-MCNC: 7.7 G/DL (ref 6–8.5)
QUANTIFERON INCUBATION, QF1T: NORMAL
QUANTIFERON TB2 AG: 0.04 IU/ML
RBC # BLD AUTO: 5.03 X10E6/UL (ref 4.14–5.8)
RBC MORPH BLD: ABNORMAL
SERVICE CMNT-IMP: NORMAL
SODIUM SERPL-SCNC: 138 MMOL/L (ref 134–144)
WBC # BLD AUTO: 4.7 X10E3/UL (ref 3.4–10.8)

## 2021-04-20 ENCOUNTER — TELEPHONE (OUTPATIENT)
Dept: RHEUMATOLOGY | Age: 41
End: 2021-04-20

## 2021-04-20 NOTE — TELEPHONE ENCOUNTER
Used 2 identifiers,and spoke with the patient, and informed him the prior Liliya Campbell is being done today. Sent message to Dr. Fallon Choi patient has missed 3 injections, and needs samples if available.

## 2021-04-20 NOTE — TELEPHONE ENCOUNTER
----- Message from Braydon Ortiz RN sent at 4/20/2021  1:47 PM EDT -----  Regarding: FW: Dr. Mando Sol    ----- Message -----  From: Annalise Jimenez  Sent: 4/20/2021   1:01 PM EDT  To: [facility] Nurse Pool  Subject: Dr. Graham Chacon Message/Vendor Calls    Caller's first and last name: Pt      Reason for call: status of PA for medication      Callback required yes/no and why: Yes      Best contact number(s): [phone]      Details to clarify the request: Mr. Melva Carrasquillo is calling to check on the status of the PA request that was submitted for his medication. He had labs drawn on 04/13/21 and was told that the office had to wait for his results to come back before they submitted the request. Looking in his lab tab, it looks as though his results are back. Please reach out to patient and notify current status.       Yesy Liter

## 2021-04-27 ENCOUNTER — TELEPHONE (OUTPATIENT)
Dept: RHEUMATOLOGY | Age: 41
End: 2021-04-27

## 2021-04-27 ENCOUNTER — OFFICE VISIT (OUTPATIENT)
Dept: RHEUMATOLOGY | Age: 41
End: 2021-04-27
Payer: COMMERCIAL

## 2021-04-27 VITALS
SYSTOLIC BLOOD PRESSURE: 156 MMHG | OXYGEN SATURATION: 98 % | HEART RATE: 88 BPM | BODY MASS INDEX: 28.88 KG/M2 | WEIGHT: 184 LBS | TEMPERATURE: 97.9 F | HEIGHT: 67 IN | RESPIRATION RATE: 88 BRPM | DIASTOLIC BLOOD PRESSURE: 96 MMHG

## 2021-04-27 DIAGNOSIS — L40.50 PSA (PSORIATIC ARTHRITIS) (HCC): Primary | ICD-10-CM

## 2021-04-27 PROCEDURE — 99214 OFFICE O/P EST MOD 30 MIN: CPT | Performed by: PEDIATRICS

## 2021-04-27 NOTE — TELEPHONE ENCOUNTER
Prior auth approved from 4/27/2021 to 4/27/2022, PA [id number], spoke with Marti Barcenas at [address] [place] [address], and she states prior auth received on Friday 4/23/2021 is pending, but she could push the approval through, and this was done after answering a few questions. Patient informed of approval, and instructed to call his pharmacy to arrange delivery of  the Enbrel [employer]. Patient understood.

## 2021-04-27 NOTE — TELEPHONE ENCOUNTER
----- Message from Miguel Rivera RN sent at 4/27/2021  9:25 AM EDT -----  Regarding: FW: Dr. Billy/ Telephone    ----- Message -----  From: Judy Johnson  Sent: 4/27/2021   9:17 AM EDT  To: [facility] Nurse Pool  Subject: Dr. Billy/ Telephone                             Caller's first and last name: Mary Grace Gunn with [employer] [address]     Reason for call: Emergency Prior Auth     Callback required yes/no and why: Yes, to confirm prior auth    Best contact number(s): [phone]    Details to clarify the request: Yany Price is requesting that office call [phone] to submit an emergency prior authorization so that pt can receive Enbrel r/x order. Pt has been without medication since March. Once prior Emeka Delbert has been approved pt needs to be contacted so that he may  r/x or have it shipped.

## 2021-04-27 NOTE — PROGRESS NOTES
Chief Complaint   Patient presents with    Arthritis     off Enbrel for 1 month shoulder and foot pain     Visit Vitals  BP (!) 156/96 (BP 1 Location: Left arm, BP Patient Position: Sitting, BP Cuff Size: Adult long)   Pulse 88   Temp 97.9 °F (36.6 °C) (Oral)   Resp (!) 88   Ht 5' 7\" (1.702 m)   Wt 184 lb (83.5 kg)   SpO2 98%   BMI 28.82 kg/m²     1. Have you been to the ER, urgent care clinic since your last visit? Hospitalized since your last visit?no    2. Have you seen or consulted any other health care providers outside of the [address] [place] [address] since your last visit? Include any pap smears or colon screening.  no

## 2021-04-27 NOTE — PROGRESS NOTES
RHEUMATOLOGY PROBLEM LIST AND CHIEF COMPLAINT  1. PsA (1998) - Polyarthritis, dactylitis, scalp and groin psoriasis, joint contracture, MTX/SZA (7537-1322), Enbrel (2003-current, suboptimally treated with enbrel every 4 weeks)     Therapy History:   Current DMARDs: Enbrel (4723-7370 every 4 weeks, weekly 3/2019-current, non adherent to weekly medication)    TB  (Q-gold) test: 4/2021 - negative    INTERVAL HISTORY  This is a 35 y.o.  male. Today, the patient complains of pain in the joints. Location: foot  Severity: 7 on a scale of 0-10   Timing: intermittent  Duration:  4 months   Modifying factors: Enbrel  Context/Associated signs and symptoms: The patient's chief complaint is morning stiffness and joint pain. He continues to have minimal psoriatic rash over his ears. He has not been on Enbrel 50 mg weekly over the past month due to running out of medication. Patient mentions a bug bite on his lower right leg that has spread into a rash. Labs reviewed included elevated LFTs. He states he may have a couple of alcoholic beverages on the weekends.        RHEUMATOLOGY REVIEW OF SYSTEMS   Positives as per history  Negatives as follows:  [place]:  Denies unexplained persistent fevers, weight change, chronic fatigue  HEAD/EYES:   Denies eye redness, blurry vision or sudden loss of vision, dry eyes, HA, temporal artery pain  ENT:    Denies oral/nasal ulcers, recurrent sinus infections, dry mouth  RESPIRATORY:  No pleuritic pain, history of pleural effusions, hemoptysis, exertional dyspnea  CARDIOVASCULAR:  Denies chest pain, history of pericardial effusions  GASTRO:   Denies heartburn, esophageal dysmotility, abdominal pain, nausea, vomiting, diarrhea, blood in the stool  HEMATOLOGIC:  No easy bruising, purpura, swollen lymph nodes  SKIN:    Denies alopecia, ulcers, nodules, sun sensitivity, unexplained persistent rash   VASCULAR:   Denies edema, cyanosis, raynaud phenomenon  NEUROLOGIC:  Denies specific muscle weakness, paresthesias   PSYCHIATRIC:  No sleep disturbance / snoring, depression, anxiety  MSK:    No morning stiffness >1 hour, SI joint pain, persistent joint swelling    PAST MEDICAL HISTORY  Reviewed with patient, significant changes in medical history - no    PHYSICAL EXAM  Blood pressure (!) 156/96, pulse 88, temperature 97.9 °F (36.6 °C), temperature source Oral, resp. rate (!) 88, height 5' 7\" (1.702 m), weight 184 lb (83.5 kg), SpO2 98 %. GENERAL APPEARANCE: Well-nourished, no acute distress  EYES: No scleral erythema, conjunctival injection  ENT: No oral ulcer, parotid enlargement  NECK: No adenopathy, thyroid enlargement  CARDIOVASCULAR: Heart rhythm is regular. No murmur, rub, gallop  CHEST: Normal vesicular breath sounds. No wheezes, rales, pleural friction rubs  ABDOMINAL: The abdomen is soft and nontender. Bowel sounds are normal  EXTREMITIES: There is no evidence of clubbing, cyanosis, edema  SKIN: No rash, palpable purpura, digital ulcer, abnormal thickening, normal nailfold capillaries   NEUROLOGICAL: Normal gait and station, full strength in upper and lower extremities,  normal sensation to light touch  MUSCULOSKELETAL:   (Right/Left)  Hands: thumb dactylitis (present, but now a chronic change); Left 3rd digit joint contracture. Left 2nd joint contracture, fusion of the left 2nd DIP. Elbows: no synovitis   Hips: Decreased ROM of the right/NA   Knees: right knee dROM/NA  Ankles: bilateral DROM on inversion and eversion with synovial thickening (improved). Left side has synovial thickening, worse than the right side. Feet: MTP decreased ROM with no tenderness (improved)    LABS, RADIOLOGY AND PROCEDURES  Previous labs reviewed -Yes  Previous radiology reviewed -Yes    03/06/2019 XR FOOT RT MIN 3 V  IMPRESSION:   1. Findings compatible with psoriatic arthritis with pencil in cup erosive  change of the second through fifth MTP joints and multiple joint ankylosis.     03/06/2019 XR FOOT LT MIN 3 V  IMPRESSION:   1. Pencil in cup deformities and ankylosis of multiple joints as detailed above  compatible with psoriatic arthritis. Previous procedures reviewed -Yes  Previous medical records reviewed/summarized -Yes    ASSESSMENT  1. PsA (1998) -(is unchanged)- The patient has flared since he ran out of medication. He should restart Enbrel 50 mg weekly once he obtains this medication from the insurance company. Explained the necessity of adherence to routine office visits and lab work in order to get his medication refilled on a routine schedule. This can improve compliance with Enbrel to prevent development of any symptoms that could cause permanent damage. I will order labs to be completed in 3 months. If LFTs remain elevated consider liver ultrasound and referral to hepatologist. However I suspect this is related to fatty liver disease. Encouraged dietary changes including lowered consumption of carbohydrates. If his rash continues to spread I recommend he consult his primary care physician. Follow up in 4-5 months. 2. Drug therapy monitoring for toxicity (biologic) - CBC, BUN, Cr, AST, ALT and albumin every 4 months    PLAN  1. Enbrel 50 mg weekly  2. Check CBC, CMP, markers of inflammation (ESR, CRP), chronic hepatitis panel   3. Return in 4-5 months    Shaggy Hammond MD  Adult and Pediatric Rheumatology     [facility], [address] [place] [address], Phone [phone], Fax [phone]     Visiting  of Pediatrics    Department of Pediatrics, [facility] of [address] [place] [address], [address] [place] [address], Phone [phone], Fax [phone]    There are no Patient Instructions on file for this visit.     cc:  Andres Conley MD    Written by torrey Aaron, as dictated by Dr. Cassius Weaver M.D.

## 2021-05-21 ENCOUNTER — DOCUMENTATION ONLY (OUTPATIENT)
Dept: RHEUMATOLOGY | Age: 41
End: 2021-05-21

## 2021-07-22 ENCOUNTER — TELEPHONE (OUTPATIENT)
Dept: RHEUMATOLOGY | Age: 41
End: 2021-07-22

## 2021-07-22 NOTE — TELEPHONE ENCOUNTER
----- Message from Blaire Hall RN sent at 7/22/2021  9:20 AM EDT -----  Regarding: FW: /Telephone    ----- Message -----  From: Evonnie Hashimoto  Sent: 7/22/2021   9:17 AM EDT  To: [facility] Nurse Pool  Subject: /Telephone                                  Caller's first and last name:Pt  Reason for call:Pt needs a new order for blood work.   Callback required yes/no and why:Yes  Best contact number(s):[phone]  Details to clarify the request:Pt wants to know if the order can be emailed to him or sent to [facility].

## 2021-07-24 LAB
ALBUMIN SERPL-MCNC: 4.1 G/DL (ref 4–5)
ALBUMIN/GLOB SERPL: 1.2 {RATIO} (ref 1.2–2.2)
ALP SERPL-CCNC: 91 IU/L (ref 48–121)
ALT SERPL-CCNC: 73 IU/L (ref 0–44)
AST SERPL-CCNC: 58 IU/L (ref 0–40)
BASOPHILS # BLD MANUAL: 0.1 X10E3/UL (ref 0–0.2)
BASOPHILS NFR BLD MANUAL: 2 %
BILIRUB SERPL-MCNC: 0.7 MG/DL (ref 0–1.2)
BUN SERPL-MCNC: 8 MG/DL (ref 6–24)
BUN/CREAT SERPL: 11 (ref 9–20)
CALCIUM SERPL-MCNC: 8.9 MG/DL (ref 8.7–10.2)
CHLORIDE SERPL-SCNC: 103 MMOL/L (ref 96–106)
CO2 SERPL-SCNC: 28 MMOL/L (ref 20–29)
COMMENT, 144067: NORMAL
CREAT SERPL-MCNC: 0.73 MG/DL (ref 0.76–1.27)
CRP SERPL-MCNC: 1 MG/L (ref 0–10)
DIFFERENTIAL COMMENT, 115260: ABNORMAL
EOSINOPHIL # BLD MANUAL: 0.1 X10E3/UL (ref 0–0.4)
EOSINOPHIL NFR BLD MANUAL: 2 %
ERYTHROCYTE [DISTWIDTH] IN BLOOD BY AUTOMATED COUNT: 14 % (ref 11.6–15.4)
ERYTHROCYTE [SEDIMENTATION RATE] IN BLOOD BY WESTERGREN METHOD: 25 MM/HR (ref 0–15)
GLOBULIN SER CALC-MCNC: 3.5 G/DL (ref 1.5–4.5)
GLUCOSE SERPL-MCNC: 134 MG/DL (ref 65–99)
HBV CORE AB SERPL QL IA: NEGATIVE
HBV CORE IGM SERPL QL IA: NEGATIVE
HBV E AB SERPL QL IA: NEGATIVE
HBV E AG SERPL QL IA: NEGATIVE
HBV SURFACE AB SER QL: NON REACTIVE
HBV SURFACE AG SERPL QL IA: NEGATIVE
HCT VFR BLD AUTO: 38.5 % (ref 37.5–51)
HCV AB S/CO SERPL IA: <0.1 S/CO RATIO (ref 0–0.9)
HGB BLD-MCNC: 12.5 G/DL (ref 13–17.7)
LYMPHOCYTES # BLD MANUAL: 2.2 X10E3/UL (ref 0.7–3.1)
LYMPHOCYTES NFR BLD MANUAL: 52 %
MCH RBC QN AUTO: 26.3 PG (ref 26.6–33)
MCHC RBC AUTO-ENTMCNC: 32.5 G/DL (ref 31.5–35.7)
MCV RBC AUTO: 81 FL (ref 79–97)
MONOCYTES # BLD MANUAL: 0.4 X10E3/UL (ref 0.1–0.9)
MONOCYTES NFR BLD MANUAL: 9 %
NEUTROPHILS # BLD MANUAL: 1.5 X10E3/UL (ref 1.4–7)
NEUTROPHILS NFR BLD MANUAL: 35 %
PLATELET # BLD AUTO: 183 X10E3/UL (ref 150–450)
PLATELET BLD QL SMEAR: ADEQUATE
POTASSIUM SERPL-SCNC: 4.6 MMOL/L (ref 3.5–5.2)
PROT SERPL-MCNC: 7.6 G/DL (ref 6–8.5)
RBC # BLD AUTO: 4.75 X10E6/UL (ref 4.14–5.8)
RBC MORPH BLD: ABNORMAL
SODIUM SERPL-SCNC: 140 MMOL/L (ref 134–144)
WBC # BLD AUTO: 4.2 X10E3/UL (ref 3.4–10.8)

## 2021-07-29 ENCOUNTER — TELEPHONE (OUTPATIENT)
Dept: RHEUMATOLOGY | Age: 41
End: 2021-07-29

## 2021-07-29 NOTE — TELEPHONE ENCOUNTER
----- Message from Hien Morocho sent at 7/27/2021  4:15 PM EDT -----  Regarding: FW: /Telephone    ----- Message -----  From: Michael Valiente  Sent: 7/27/2021   3:58 PM EDT  To: [facility] Front Office Pool  Subject: /Telephone                                  Caller's first and last name:Pt  Reason for call:request to speak to practice manager  Callback required yes/no and why:Yes  Best contact number(s):[phone]  Details to clarify the request:Pt states he called earlier to say he was running late but now he is being put down as a NO Show and wants to dispute this so would like to talk to the practice manager if possible.

## 2021-10-05 ENCOUNTER — OFFICE VISIT (OUTPATIENT)
Dept: RHEUMATOLOGY | Age: 41
End: 2021-10-05
Payer: COMMERCIAL

## 2021-10-05 VITALS
SYSTOLIC BLOOD PRESSURE: 152 MMHG | DIASTOLIC BLOOD PRESSURE: 88 MMHG | TEMPERATURE: 98.1 F | WEIGHT: 176 LBS | HEART RATE: 78 BPM | BODY MASS INDEX: 27.57 KG/M2 | RESPIRATION RATE: 16 BRPM | OXYGEN SATURATION: 98 %

## 2021-10-05 DIAGNOSIS — R79.89 ELEVATED LFTS: Primary | ICD-10-CM

## 2021-10-05 DIAGNOSIS — L40.50 PSA (PSORIATIC ARTHRITIS) (HCC): ICD-10-CM

## 2021-10-05 PROCEDURE — 99214 OFFICE O/P EST MOD 30 MIN: CPT | Performed by: PEDIATRICS

## 2021-10-05 RX ORDER — SEMAGLUTIDE 1.34 MG/ML
INJECTION, SOLUTION SUBCUTANEOUS
COMMUNITY
Start: 2021-10-04

## 2021-10-05 RX ORDER — OLMESARTAN MEDOXOMIL 20 MG/1
20 TABLET ORAL DAILY
COMMUNITY

## 2021-10-05 NOTE — PROGRESS NOTES
Chief Complaint   Patient presents with    Joint Pain     1. Have you been to the ER, urgent care clinic since your last visit? Hospitalized since your last visit? No    2. Have you seen or consulted any other health care providers outside of the [address] [place] [address] since your last visit? Include any pap smears or colon screening.  No

## 2021-10-05 NOTE — PROGRESS NOTES
RHEUMATOLOGY PROBLEM LIST AND CHIEF COMPLAINT  1. PsA (1998) - Polyarthritis, dactylitis, scalp and groin psoriasis, joint contracture, MTX/SZA (8874-0448), Enbrel (2003-current, suboptimally treated with enbrel every 4 weeks)     Therapy History:   Current DMARDs: Enbrel (2784-0286 every 4 weeks, weekly 3/2019-current, non adherent to weekly medication)    TB  (Q-gold) test: 4/2021 - negative    INTERVAL HISTORY  This is a 35 y.o.  male. Today, the patient complains of pain in the joints. Location: hands  Severity: 0 on a scale of 0-10   Timing: intermittent  Duration: 6 months   Modifying factors: Enbrel  Context/Associated signs and symptoms: The patient reports doing well overall with no significant joint pain or psoriatic rash. However he notes some intermittent achy joint pain in his fingers. He reports minimal psoriatic rash over his ears on occasion. He continues on Enbrel 50 mg weekly. July labs reviewed included mild anemia, elevated blood glucose and mildly elevated LFTs. Of note, he mentions recent diagnosis of type ii diabetes that is being treated with Ozempic.      RHEUMATOLOGY REVIEW OF SYSTEMS   Positives as per history  Negatives as follows:  Richad Gearing:  Denies unexplained persistent fevers, weight change, chronic fatigue  HEAD/EYES:   Denies eye redness, blurry vision or sudden loss of vision, dry eyes, HA, temporal artery pain  ENT:    Denies oral/nasal ulcers, recurrent sinus infections, dry mouth  RESPIRATORY:  No pleuritic pain, history of pleural effusions, hemoptysis, exertional dyspnea  CARDIOVASCULAR:  Denies chest pain, history of pericardial effusions  GASTRO:   Denies heartburn, esophageal dysmotility, abdominal pain, nausea, vomiting, diarrhea, blood in the stool  HEMATOLOGIC:  No easy bruising, purpura, swollen lymph nodes  SKIN:    Denies alopecia, ulcers, nodules, sun sensitivity, unexplained persistent rash   VASCULAR:   Denies edema, cyanosis, raynaud phenomenon  NEUROLOGIC:  Denies specific muscle weakness, paresthesias   PSYCHIATRIC:  No sleep disturbance / snoring, depression, anxiety  MSK:    No morning stiffness >1 hour, SI joint pain, persistent joint swelling    PAST MEDICAL HISTORY  Reviewed with patient, significant changes in medical history - yes, type ii diabetes diagnosis     PHYSICAL EXAM  Blood pressure (!) 152/88, pulse 78, temperature 98.1 °F (36.7 °C), temperature source Oral, resp. rate 16, weight 176 lb (79.8 kg), SpO2 98 %. GENERAL APPEARANCE: Well-nourished, no acute distress  EYES: No scleral erythema, conjunctival injection  ENT: No oral ulcer, parotid enlargement  NECK: No adenopathy, thyroid enlargement  CARDIOVASCULAR: Heart rhythm is regular. No murmur, rub, gallop  CHEST: Normal vesicular breath sounds. No wheezes, rales, pleural friction rubs  ABDOMINAL: The abdomen is soft and nontender. Bowel sounds are normal  EXTREMITIES: There is no evidence of clubbing, cyanosis, edema  SKIN: No rash, palpable purpura, digital ulcer, abnormal thickening, normal nailfold capillaries   NEUROLOGICAL: Normal gait and station, full strength in upper and lower extremities,  normal sensation to light touch  MUSCULOSKELETAL:   (Right/Left)  Hands: thumb dactylitis (present, but now a chronic change); Left 3rd digit joint contracture. Left 2nd joint contracture, fusion of the left 2nd DIP. Elbows: no synovitis   Hips: Decreased ROM of the right/NA   Knees: right knee dROM/NA  Ankles: bilateral DROM on inversion and eversion with synovial thickening (improved). Left side has synovial thickening, worse than the right side. Feet: MTP decreased ROM with no tenderness (improved)    LABS, RADIOLOGY AND PROCEDURES  Previous labs reviewed -Yes  Previous radiology reviewed -Yes    03/06/2019 XR FOOT RT MIN 3 V  IMPRESSION:   1.  Findings compatible with psoriatic arthritis with pencil in cup erosive  change of the second through fifth MTP joints and multiple joint ankylosis. 03/06/2019 XR FOOT LT MIN 3 V  IMPRESSION:   1. Pencil in cup deformities and ankylosis of multiple joints as detailed above  compatible with psoriatic arthritis. Previous procedures reviewed -Yes  Previous medical records reviewed/summarized -Yes    ASSESSMENT  1. PsA (1998) -(is unchanged)- The patient continues to do well on his current regimen. He should continue on Enbrel 50 mg weekly. If his joint discomfort persists, we will consider escalating treatment. As he has mild elevated of LFTs over the past 2-3 years, I will order an abdominal ultrasound to evaluate for possible liver disease (eg, fatty liver). Encouraged dietary changes including lowered consumption of carbohydrates. I will order labs to be completed in 3 months. Follow up in 4 months. 2. Drug therapy monitoring for toxicity (biologic) - CBC, BUN, Cr, AST, ALT and albumin every 4 months    PLAN  1. Enbrel 50 mg weekly  2. Check CBC & CMP  3. Abdominal ultrasound   4. Return in 4-5 months    Shaggy Simon MD  Adult and Pediatric Rheumatology     [facility], [address] [place] [address], Phone [phone], Fax [phone]     Visiting  of Pediatrics    Department of Pediatrics, [facility] of [address] [place] [address], [address] [place] [address], Phone [phone], Fax [phone]    There are no Patient Instructions on file for this visit.     cc:  Britta Spear MD    Written by torrey Cisneros, as dictated by Dr. Kenya Mora M.D.

## 2021-10-28 ENCOUNTER — TELEPHONE (OUTPATIENT)
Dept: RHEUMATOLOGY | Age: 41
End: 2021-10-28

## 2021-10-28 DIAGNOSIS — L40.50 PSA (PSORIATIC ARTHRITIS) (HCC): ICD-10-CM

## 2021-10-28 DIAGNOSIS — R79.89 ELEVATED LFTS: Primary | ICD-10-CM

## 2021-10-28 NOTE — TELEPHONE ENCOUNTER
----- Message from Janet Gallito sent at 10/27/2021 11:47 AM EDT -----  Regarding: FW: /telephone  The only referral I see in their chart is for an Ultrasound, nothing there yet to see a specialist.   ----- Message -----  From: Rip Oly  Sent: 10/27/2021  11:40 AM EDT  To: [facility] Front Office Pool  Subject: /telephone                               General Message/Vendor Calls    Caller's first and last name:self      Reason for call:pt called to speak with Dr. Joyce Garcia required yes/no and why: yes to speak with Dr. Gómez Superior contact number(s):[phone]      Details to clarify the request: pt called to speak with Dr. Lynn Nguyen, regarding on the referral that Keysha Zaidi referred the pt to see a liver specialist.       Darren Rodas

## 2021-10-28 NOTE — TELEPHONE ENCOUNTER
Spoke to pt provided pt the number to central scheduling for the pt to call and get the abdominal ultrasound scheduled, pt verbally acknowledged understanding

## 2021-10-29 ENCOUNTER — TELEPHONE (OUTPATIENT)
Dept: RHEUMATOLOGY | Age: 41
End: 2021-10-29

## 2021-10-29 NOTE — TELEPHONE ENCOUNTER
Left voicemail informing pt that Dr Haley Bauman has ordered a referral to Liver Hepatology, pt can pick the referral up from the office or it can be mailed to the pt address on file

## 2021-11-24 RX ORDER — ETANERCEPT 50 MG/ML
SOLUTION SUBCUTANEOUS
Qty: 4 EACH | Refills: 2 | Status: SHIPPED | OUTPATIENT
Start: 2021-11-24 | End: 2022-04-12 | Stop reason: SDUPTHER

## 2022-01-20 ENCOUNTER — HOSPITAL ENCOUNTER (OUTPATIENT)
Dept: ULTRASOUND IMAGING | Age: 42
Discharge: HOME | End: 2022-01-20
Attending: PEDIATRICS
Payer: COMMERCIAL

## 2022-01-20 DIAGNOSIS — R79.89 ELEVATED LFTS: ICD-10-CM

## 2022-01-20 PROCEDURE — 76705 ECHO EXAM OF ABDOMEN: CPT

## 2022-02-05 LAB
ALBUMIN SERPL-MCNC: 4.6 G/DL (ref 4–5)
ALBUMIN/GLOB SERPL: 1.3 {RATIO} (ref 1.2–2.2)
ALP SERPL-CCNC: 78 IU/L (ref 44–121)
ALT SERPL-CCNC: 49 IU/L (ref 0–44)
AST SERPL-CCNC: 46 IU/L (ref 0–40)
BASOPHILS # BLD MANUAL: 0.1 X10E3/UL (ref 0–0.2)
BASOPHILS NFR BLD MANUAL: 1 %
BILIRUB SERPL-MCNC: 0.7 MG/DL (ref 0–1.2)
BUN SERPL-MCNC: 9 MG/DL (ref 6–24)
BUN/CREAT SERPL: 11 (ref 9–20)
CALCIUM SERPL-MCNC: 9.2 MG/DL (ref 8.7–10.2)
CHLORIDE SERPL-SCNC: 100 MMOL/L (ref 96–106)
CO2 SERPL-SCNC: 25 MMOL/L (ref 20–29)
CREAT SERPL-MCNC: 0.8 MG/DL (ref 0.76–1.27)
DIFFERENTIAL COMMENT, 115260: NORMAL
EOSINOPHIL # BLD MANUAL: 0.1 X10E3/UL (ref 0–0.4)
EOSINOPHIL NFR BLD MANUAL: 2 %
ERYTHROCYTE [DISTWIDTH] IN BLOOD BY AUTOMATED COUNT: 12.9 % (ref 11.6–15.4)
GLOBULIN SER CALC-MCNC: 3.5 G/DL (ref 1.5–4.5)
GLUCOSE SERPL-MCNC: 113 MG/DL (ref 65–99)
HCT VFR BLD AUTO: 39.9 % (ref 37.5–51)
HGB BLD-MCNC: 13.5 G/DL (ref 13–17.7)
LYMPHOCYTES # BLD MANUAL: 2 X10E3/UL (ref 0.7–3.1)
LYMPHOCYTES NFR BLD MANUAL: 39 %
MCH RBC QN AUTO: 27.9 PG (ref 26.6–33)
MCHC RBC AUTO-ENTMCNC: 33.8 G/DL (ref 31.5–35.7)
MCV RBC AUTO: 82 FL (ref 79–97)
MONOCYTES # BLD MANUAL: 0.4 X10E3/UL (ref 0.1–0.9)
MONOCYTES NFR BLD MANUAL: 8 %
NEUTROPHILS # BLD MANUAL: 2.5 X10E3/UL (ref 1.4–7)
NEUTROPHILS NFR BLD MANUAL: 50 %
PLATELET # BLD AUTO: 190 X10E3/UL (ref 150–450)
PLATELET BLD QL SMEAR: ADEQUATE
POTASSIUM SERPL-SCNC: 4.3 MMOL/L (ref 3.5–5.2)
PROT SERPL-MCNC: 8.1 G/DL (ref 6–8.5)
RBC # BLD AUTO: 4.84 X10E6/UL (ref 4.14–5.8)
RBC MORPH BLD: NORMAL
SODIUM SERPL-SCNC: 139 MMOL/L (ref 134–144)
WBC # BLD AUTO: 5 X10E3/UL (ref 3.4–10.8)

## 2022-02-08 ENCOUNTER — OFFICE VISIT (OUTPATIENT)
Dept: RHEUMATOLOGY | Age: 42
End: 2022-02-08
Payer: COMMERCIAL

## 2022-02-08 VITALS
HEART RATE: 86 BPM | BODY MASS INDEX: 27.41 KG/M2 | OXYGEN SATURATION: 96 % | SYSTOLIC BLOOD PRESSURE: 145 MMHG | WEIGHT: 175 LBS | DIASTOLIC BLOOD PRESSURE: 95 MMHG | TEMPERATURE: 98 F | RESPIRATION RATE: 16 BRPM

## 2022-02-08 DIAGNOSIS — L40.50 PSA (PSORIATIC ARTHRITIS) (HCC): ICD-10-CM

## 2022-02-08 DIAGNOSIS — R79.89 ELEVATED LFTS: Primary | ICD-10-CM

## 2022-02-08 PROCEDURE — 99214 OFFICE O/P EST MOD 30 MIN: CPT | Performed by: PEDIATRICS

## 2022-02-08 RX ORDER — ETANERCEPT 50 MG/ML
50 SOLUTION SUBCUTANEOUS
Qty: 3.92 ML | Refills: 3 | Status: SHIPPED | OUTPATIENT
Start: 2022-02-08 | End: 2022-03-10

## 2022-02-08 NOTE — PROGRESS NOTES
RHEUMATOLOGY PROBLEM LIST AND CHIEF COMPLAINT  1. PsA (1998) - Polyarthritis, dactylitis, scalp and groin psoriasis, joint contracture, MTX/SZA ([id number]), Enbrel (2003-current, suboptimally treated with enbrel every 4 weeks)     Therapy History:   Current DMARDs: Enbrel ([id number] every 4 weeks, weekly 3/2019-current, non adherent to weekly medication, noted hold for 3 months from 11/2021-2/2022)    TB  (Q-gold) test: 4/2021 - negative    INTERVAL HISTORY  This is a 35 y.o.  male. Today, the patient complains of pain in the joints. Location: back  Severity: 3 on a scale of 0-10   Timing: intermittent  Duration: 4 months   Modifying factors: Enbrel  Context/Associated signs and symptoms: The patient reports doing well overall with no significant joint pain or psoriatic rash. He reports achy pain and mild stiffness in his upper back that is more significant in the mornings over the past month. He continues on Enbrel 50 mg weekly, but notes that he has not used this medication over the past three months. Labs reviewed included normal CBC and elevated but improved ALT (49, previously 73), AST (46, previously 58), blood glucose (113, previously 134). Abdominal ultrasound reviewed showed hepatic steatosis and hepatomegaly. Of note, he reports recently traveling back from [facility] following the passing of his father. He states that his joint symptoms seem to be improved while in [facility], regardless of use of medication.      RHEUMATOLOGY REVIEW OF SYSTEMS   Positives as per history  Negatives as follows:  Johnney Seip:  Denies unexplained persistent fevers, weight change, chronic fatigue  HEAD/EYES:   Denies eye redness, blurry vision or sudden loss of vision, dry eyes, HA, temporal artery pain  ENT:    Denies oral/nasal ulcers, recurrent sinus infections, dry mouth  RESPIRATORY:  No pleuritic pain, history of pleural effusions, hemoptysis, exertional dyspnea  CARDIOVASCULAR:  Denies chest pain, history of pericardial effusions  GASTRO:   Denies heartburn, esophageal dysmotility, abdominal pain, nausea, vomiting, diarrhea, blood in the stool  HEMATOLOGIC:  No easy bruising, purpura, swollen lymph nodes  SKIN:    Denies alopecia, ulcers, nodules, sun sensitivity, unexplained persistent rash   VASCULAR:   Denies edema, cyanosis, raynaud phenomenon  NEUROLOGIC:  Denies specific muscle weakness, paresthesias   PSYCHIATRIC:  No sleep disturbance / snoring, depression, anxiety  MSK:    No morning stiffness >1 hour, SI joint pain, persistent joint swelling    PAST MEDICAL HISTORY  Reviewed with patient, significant changes in medical history - no     PHYSICAL EXAM  Blood pressure (!) 145/95, pulse 86, temperature 98 °F (36.7 °C), temperature source Oral, resp. rate 16, weight 175 lb (79.4 kg), SpO2 96 %. GENERAL APPEARANCE: Well-nourished, no acute distress  EYES: No scleral erythema, conjunctival injection  ENT: No oral ulcer, parotid enlargement  NECK: No adenopathy, thyroid enlargement  CARDIOVASCULAR: Heart rhythm is regular. No murmur, rub, gallop  CHEST: Normal vesicular breath sounds. No wheezes, rales, pleural friction rubs  ABDOMINAL: The abdomen is soft and nontender. Bowel sounds are normal  EXTREMITIES: There is no evidence of clubbing, cyanosis, edema  SKIN: No rash, palpable purpura, digital ulcer, abnormal thickening, normal nailfold capillaries   NEUROLOGICAL: Normal gait and station, full strength in upper and lower extremities,  normal sensation to light touch  MUSCULOSKELETAL:   (Right/Left)  Hands: thumb dactylitis (present, but now a chronic change); Left 3rd digit joint contracture. Left 2nd joint contracture, fusion of the left 2nd DIP. Elbows: no synovitis   Hips: Decreased ROM of the right/NA   Knees: right knee dROM/NA  Ankles: bilateral DROM on inversion and eversion with synovial thickening (unchanged). Left side has synovial thickening, worse than the right side.    Feet: MTP decreased ROM with no tenderness (unchanged)     LABS, RADIOLOGY AND PROCEDURES  Previous labs reviewed -Yes  Previous radiology reviewed -Yes    03/06/2019 XR FOOT RT MIN 3 V  IMPRESSION:   1. Findings compatible with psoriatic arthritis with pencil in cup erosive  change of the second through fifth MTP joints and multiple joint ankylosis. 03/06/2019 XR FOOT LT MIN 3 V  IMPRESSION:   1. Pencil in cup deformities and ankylosis of multiple joints as detailed above  compatible with psoriatic arthritis. Previous procedures reviewed -Yes  Previous medical records reviewed/summarized -Yes    ASSESSMENT  1. PsA (1998) -(is unchanged)- The patient's disease has not worsened significantly while off treatment. Encouraged him to restart Enbrel 50 mg weekly to prevent worsening of disease. If his neck range of motion does not improve with use of Enbrel, he should contact the office and we will plan to pursue further evaluation with an x-ray. Explained that his type ii diabetes could also be contributing to his elevated LFTs/fatty liver disease and encouraged proper management of this disease. Labs are not needed today, but will be checked at next visit. Follow up in 5 months. 2. Drug therapy monitoring for toxicity (biologic) - CBC, BUN, Cr, AST, ALT and albumin every 4 months    PLAN  1. Enbrel 50 mg pen weekly  2. Check CBC & CMP at next visit   3. Return in 5 months    Shaggy Morgan MD  Adult and Pediatric Rheumatology     [facility], [address] [place] [address], Phone [phone], Fax [phone]     Visiting  of Pediatrics    Department of Pediatrics, [facility] of [address] [place] [address], [address] [place] [address], Phone [phone], Fax [phone]    There are no Patient Instructions on file for this visit.     cc:  Kimber Olivera MD    Written by torrey Lopez, as dictated by Dr. Leoncio Hill M.D.

## 2022-04-12 RX ORDER — ETANERCEPT 50 MG/ML
SOLUTION SUBCUTANEOUS
Qty: 4 EACH | Refills: 2 | Status: SHIPPED | OUTPATIENT
Start: 2022-04-12 | End: 2022-04-29 | Stop reason: SDUPTHER

## 2022-04-29 ENCOUNTER — DOCUMENTATION ONLY (OUTPATIENT)
Dept: PHARMACY | Age: 42
End: 2022-04-29

## 2022-04-29 RX ORDER — ETANERCEPT 50 MG/ML
SOLUTION SUBCUTANEOUS
Qty: 4 EACH | Refills: 2 | Status: SHIPPED | OUTPATIENT
Start: 2022-04-29

## 2022-05-23 NOTE — PROGRESS NOTES
Chief Complaint   Patient presents with    Joint Pain     1. Have you been to the ER, urgent care clinic since your last visit? Hospitalized since your last visit? No    2. Have you seen or consulted any other health care providers outside of the [address] [place] [address] since your last visit? Include any pap smears or colon screening.  No Patient was counseled on smoking cessation for 4 minutes. We discussed how smoking is detrimental to the patient's health, specifically her respiratory failure.

## 2022-09-22 ENCOUNTER — OFFICE VISIT (OUTPATIENT)
Dept: NEUROLOGY | Age: 42
End: 2022-09-22

## 2022-09-22 DIAGNOSIS — Z91.199 NO-SHOW FOR APPOINTMENT: Primary | ICD-10-CM

## 2022-09-26 NOTE — PROGRESS NOTES
Pt not seen or examined by me on this date of service. It appears the appointment was cancelled/ rescheduled.     Closing this \"note\" as required by EMR

## 2022-10-20 ENCOUNTER — OFFICE VISIT (OUTPATIENT)
Dept: NEUROLOGY | Age: 42
End: 2022-10-20
Payer: COMMERCIAL

## 2022-10-20 VITALS
WEIGHT: 175 LBS | HEART RATE: 102 BPM | SYSTOLIC BLOOD PRESSURE: 130 MMHG | DIASTOLIC BLOOD PRESSURE: 70 MMHG | HEIGHT: 67 IN | OXYGEN SATURATION: 98 % | BODY MASS INDEX: 27.47 KG/M2

## 2022-10-20 DIAGNOSIS — G25.0 ESSENTIAL TREMOR: Primary | ICD-10-CM

## 2022-10-20 PROCEDURE — 99204 OFFICE O/P NEW MOD 45 MIN: CPT | Performed by: PSYCHIATRY & NEUROLOGY

## 2022-10-20 RX ORDER — ROSUVASTATIN CALCIUM 10 MG/1
TABLET, COATED ORAL
COMMUNITY
Start: 2022-10-01

## 2022-10-20 RX ORDER — PRIMIDONE 50 MG/1
TABLET ORAL
Qty: 83 TABLET | Refills: 0 | Status: SHIPPED | OUTPATIENT
Start: 2022-10-20 | End: 2023-01-18

## 2022-10-20 NOTE — PROGRESS NOTES
Alfredo Kumar (1980) is a 38 y.o. male, new patient, here for evaluation of the following     Chief complaint(s): No chief complaint on file. HPI: 38 y.o. male right handed     Referred by Casandra Badillo MD / PCP for evaluation of Essential Tremor    Patient describes that he has had hand tremors for as long as he remembers/ dating back to childhood. No family members with similar tremors. Recalls that in the late 80s or early 46's a family member who is/was a pathologist did some blood test on him and did not find any evidence of Parkinson's. He describes that over the last 1 year the hand tremors (left/ non-dominant hand more than right) are more noticeable to him, more frequent. No resting tremors but he and others see his hand trembling when he is holding objects. He denies any change in his gait. No vocal tremor and no head tremor. Says he gets good/ adequate sleep (about 8 hrs/ night), doesn't drink excessive caffeine, and no hx of hyperthyroidism     Reviewed clinic note dated 3-1-2022 sent by PCP with question of starting medication vs gamma-knife for left hand tremor. Reviewed lab results    7/6/2022: A1c 6.4. Lipid panel with total cholesterol 95, triglyceride 366, HDL 34, LDL 88    3/1/2022: Free T4 0.60, thyroglobulin antibody less than 1 (normal), thyroid peroxidase antibody less than 8 (normal)    2/15/2022: TSH elevated 5.46, CBC normal, CMP normal except for minimal elevation of AST (40)      Review of Systems: High blood pressure, rashes/skin problems     ==================================================    No Known Allergies    Current Outpatient Medications   Medication Sig Dispense Refill    rosuvastatin (CRESTOR) 10 mg tablet       naproxen sodium (ALEVE PO) Take  by mouth.      primidone (MYSOLINE) 50 mg tablet Take 0.5 Tablets by mouth nightly for 14 days, THEN 1 Tablet nightly for 76 days.  Indications: essential tremor 83 Tablet 0    EnbreL SureClick 50 mg/mL (1 mL) injection Inject 1 ml subcutaneous every week 4 Each 2    Ozempic 1 mg/dose (4 mg/3 mL) pnij       olmesartan (BENICAR) 20 mg tablet Take 20 mg by mouth daily. No past medical history on file. No past surgical history on file. family history is not on file. reports that he has never smoked. He has never used smokeless tobacco. He reports that he does not drink alcohol and does not use drugs. PHYSICAL EXAM    Vitals:    10/20/22 1258   BP: 130/70   Pulse: (!) 102   Height: 5' 7\" (1.702 m)   Weight: 79.4 kg (175 lb)   SpO2: 98%     Awake alert oriented x3. EOMI, visual fields are bilateral, hearing and speech are normal.  Face appears symmetric facial sensation is intact on both sides. Tongue is midline, soft palate elevates symmetrically. Shoulder shrug is symmetric. No resting tremors or cogwheel rigidity in either upper extremity  Mild bilateral postural and intention tremor (left more than right)  Intact light touch in all extremities  5 out of 5 strength in all extremities  Reflexes are 1+ and symmetric throughout  Gait is normal  Romberg exam is negative      ==================================================    ASSESSMENT/ PLAN:       ICD-10-CM ICD-9-CM    1. Essential tremor  G25.0 333.1 primidone (MYSOLINE) 50 mg tablet         Discussed with patient that his history and exam are consistent with essential tremor  No exam findings to suggest Parkinson's    D/w patient that starting mediation treatment for Essential Tremor is indicated when the tremor starts to affect function (holding objects, handwriting) or if it's socially embarrassing. Pt says the tremor has progressed to the point that he now wants to try a medication, to see if it can help/ suppress the tremors. We discussed multiple medication options (propranolol, topamax, primidone) as well as their side effect profiles. After some consideration, pt decided to try Primidone.   If that doesn't work for him, his next choice would be propranolol. Sent Rx for Primidone 50 mg tablet, HALF tablet at bedtime for 2 weeks then increase to 1 full tablet at bedtime. Asked him to call or message the office in 6 weeks to update me on any improvement with the medication. If he is not noticing a benefit I will instruct him to increase the primidone to 2 tablets at bedtime (i.e. 100 mg at bedtime). In terms of any interventional procedure for essential tremor (ie Focused Ultrasound Therapy vs other) that would only be indicated in the setting of medically refractory, and generally severe ET. Hopefully a medication will work for him, but if not, I can refer him to Dr Bijal Caruso Neurosurgery to discuss Focused Ultrasound Therapy. Follow-up visit will be in 3 months       An electronic signature was used to authenticate this note.   -- Ling Johnson MD

## 2022-10-20 NOTE — LETTER
10/20/2022    Patient: Robbie Boyce   YOB: 1980   Date of Visit: 10/20/2022     Sergey Gupta, [address] [place] [phone]  Via Fax: [phone]    Dear Sergey Gupta MD,      Thank you for referring Mr. Alfredo Kumar to [facility] for evaluation. My notes for this consultation are attached. If you have questions, please do not hesitate to call me. I look forward to following your patient along with you.       Sincerely,    Kamron Marion MD

## 2022-11-01 ENCOUNTER — OFFICE VISIT (OUTPATIENT)
Dept: RHEUMATOLOGY | Age: 42
End: 2022-11-01
Payer: COMMERCIAL

## 2022-11-01 VITALS
WEIGHT: 179 LBS | DIASTOLIC BLOOD PRESSURE: 77 MMHG | OXYGEN SATURATION: 99 % | RESPIRATION RATE: 16 BRPM | SYSTOLIC BLOOD PRESSURE: 138 MMHG | TEMPERATURE: 98 F | BODY MASS INDEX: 28.04 KG/M2 | HEART RATE: 79 BPM

## 2022-11-01 DIAGNOSIS — L40.50 PSA (PSORIATIC ARTHRITIS) (HCC): ICD-10-CM

## 2022-11-01 DIAGNOSIS — R79.89 ELEVATED LFTS: Primary | ICD-10-CM

## 2022-11-01 PROCEDURE — 99214 OFFICE O/P EST MOD 30 MIN: CPT | Performed by: PEDIATRICS

## 2022-11-01 NOTE — PROGRESS NOTES
RHEUMATOLOGY PROBLEM LIST AND CHIEF COMPLAINT  1. PsA (1998) - Polyarthritis, dactylitis, scalp and groin psoriasis, joint contracture, MTX/SZA (9927-2059), Enbrel (2003-current, suboptimally treated with enbrel every 4 weeks)     Therapy History:   Current DMARDs: Enbrel ([id number] every 4 weeks, weekly 3/2019-current, non adherent to weekly medication, noted hold for 3 months from 11/2021-2/2022)    TB  (Q-gold) test: 4/2021 - negative    INTERVAL HISTORY  This is a 38 y.o.  male. Today, the patient complains of pain in the joints. Location: generalized  Severity: 0 on a scale of 0-10   Timing: intermittent  Duration: 9 months   Modifying factors: Enbrel  Context/Associated signs and symptoms: The patient reports doing well overall. He notes that the back pain he was complaining about at his last visit has improved. He continues on Enbrel 50 mg pen weekly. He notes recurrence of psoriatic rash when he misses a dose of Enbrel.      RHEUMATOLOGY REVIEW OF SYSTEMS   Positives as per history  Negatives as follows:  Onpalmirae Anika:  Denies unexplained persistent fevers, weight change, chronic fatigue  HEAD/EYES:   Denies eye redness, blurry vision or sudden loss of vision, dry eyes, HA, temporal artery pain  ENT:    Denies oral/nasal ulcers, recurrent sinus infections, dry mouth  RESPIRATORY:  No pleuritic pain, history of pleural effusions, hemoptysis, exertional dyspnea  CARDIOVASCULAR:  Denies chest pain, history of pericardial effusions  GASTRO:   Denies heartburn, esophageal dysmotility, abdominal pain, nausea, vomiting, diarrhea, blood in the stool  HEMATOLOGIC:  No easy bruising, purpura, swollen lymph nodes  SKIN:    Denies alopecia, ulcers, nodules, sun sensitivity, unexplained persistent rash   VASCULAR:   Denies edema, cyanosis, raynaud phenomenon  NEUROLOGIC:  Denies specific muscle weakness, paresthesias   PSYCHIATRIC:  No sleep disturbance / snoring, depression, anxiety  MSK:    No morning stiffness >1 hour, SI joint pain, persistent joint swelling    PAST MEDICAL HISTORY  Reviewed with patient, significant changes in medical history - no     PHYSICAL EXAM  Blood pressure 138/77, pulse 79, temperature 98 °F (36.7 °C), temperature source Oral, resp. rate 16, weight 179 lb (81.2 kg), SpO2 99 %. GENERAL APPEARANCE: Well-nourished, no acute distress  EYES: No scleral erythema, conjunctival injection  ENT: No oral ulcer, parotid enlargement  NECK: No adenopathy, thyroid enlargement  CARDIOVASCULAR: Heart rhythm is regular. No murmur, rub, gallop  CHEST: Normal vesicular breath sounds. No wheezes, rales, pleural friction rubs  ABDOMINAL: The abdomen is soft and nontender. Bowel sounds are normal  EXTREMITIES: There is no evidence of clubbing, cyanosis, edema  SKIN: No rash, palpable purpura, digital ulcer, abnormal thickening, normal nailfold capillaries   NEUROLOGICAL: Normal gait and station, full strength in upper and lower extremities,  normal sensation to light touch  MUSCULOSKELETAL:   (Right/Left)  Hands: thumb dactylitis (present, but now a chronic change); Left 3rd digit joint contracture. Left 2nd joint contracture, fusion of the left 2nd DIP. Elbows: no synovitis   Hips: Decreased ROM of the right/NA   Knees: right knee dROM/NA  Ankles: bilateral DROM on inversion and eversion with synovial thickening (unchanged). Left side has synovial thickening, worse than the right side. Feet: MTP decreased ROM with no tenderness (unchanged)     LABS, RADIOLOGY AND PROCEDURES  Previous labs reviewed -Yes  Previous radiology reviewed -Yes    03/06/2019 XR FOOT RT MIN 3 V  IMPRESSION:   1. Findings compatible with psoriatic arthritis with pencil in cup erosive  change of the second through fifth MTP joints and multiple joint ankylosis. 03/06/2019 XR FOOT LT MIN 3 V  IMPRESSION:   1.  Pencil in cup deformities and ankylosis of multiple joints as detailed above  compatible with psoriatic arthritis. Previous procedures reviewed -Yes  Previous medical records reviewed/summarized -Yes    ASSESSMENT  1. PsA (1998) -(is unchanged)- The patient's disease remains well controlled on current medication however he has some more decreased ROM likely from non-adherence to medications. He will continue Enbrel 50 mg weekly and I discussed taking this every week to prevent further joint damage. I ordered lab work today. 2. Drug therapy monitoring for toxicity (biologic) - CBC, BUN, Cr, AST, ALT and albumin every 4 months    PLAN  1. Enbrel 50 mg pen weekly  2. Check CBC, CMP, quantiferon TB   3. Return in 5 months    Shaggy Rosario MD  Adult and Pediatric Rheumatology     [facility], [address] [place] [address], Phone [phone], Fax [phone]    Visiting  of Pediatrics    Department of Pediatrics, [address], Phone [phone], Fax [phone]    There are no Patient Instructions on file for this visit. cc:  Latia Arango MD    I, Peace Ozuna MD, personally performed the services described in the documentation as scribed by Elvia Rosen in my presence and have reviewed and agree with the note as scribed.

## 2022-11-01 NOTE — PROGRESS NOTES
Chief Complaint   Patient presents with    Joint Pain     1. Have you been to the ER, urgent care clinic since your last visit? Hospitalized since your last visit? No    2. Have you seen or consulted any other health care providers outside of the [address] [place] [address] since your last visit? Include any pap smears or colon screening.  No

## 2022-11-28 RX ORDER — ETANERCEPT 50 MG/ML
SOLUTION SUBCUTANEOUS
Qty: 4 EACH | Refills: 3 | Status: SHIPPED | OUTPATIENT
Start: 2022-11-28

## 2023-03-14 ENCOUNTER — OFFICE VISIT (OUTPATIENT)
Dept: RHEUMATOLOGY | Age: 43
End: 2023-03-14
Payer: COMMERCIAL

## 2023-03-14 VITALS
OXYGEN SATURATION: 98 % | HEART RATE: 96 BPM | TEMPERATURE: 97.9 F | DIASTOLIC BLOOD PRESSURE: 85 MMHG | BODY MASS INDEX: 28.98 KG/M2 | RESPIRATION RATE: 16 BRPM | SYSTOLIC BLOOD PRESSURE: 144 MMHG | WEIGHT: 185 LBS

## 2023-03-14 DIAGNOSIS — L40.50 PSA (PSORIATIC ARTHRITIS) (HCC): Primary | ICD-10-CM

## 2023-03-14 PROCEDURE — 99214 OFFICE O/P EST MOD 30 MIN: CPT | Performed by: PEDIATRICS

## 2023-03-14 NOTE — PROGRESS NOTES
RHEUMATOLOGY PROBLEM LIST AND CHIEF COMPLAINT  1. PsA (1998) - Polyarthritis, dactylitis, scalp and groin psoriasis, joint contracture, MTX/SZA ([id number]), Enbrel (2003-current, suboptimally treated with enbrel every 4 weeks)     Therapy History:   Current DMARDs: Enbrel ([id number] every 4 weeks, weekly 3/2019-current, non adherent to weekly medication, noted hold for 3 months from 11/2021-2/2022)    TB  (Q-gold) test: 4/2021 - negative    INTERVAL HISTORY  This is a 37 y.o.  male. Today, the patient complains of no pain in the joints. Location: generalized  Severity: 0 on a scale of 0-10   Timing: intermittent  Duration: 4 months   Modifying factors: Enbrel  Context/Associated signs and symptoms: The patient has been doing well. He continues on Enbrel 50 mg pen weekly. He denies any morning stiffness, join tpain, joint swelling, psoriasis. His BP today is elevated at 144/85, but he states that he had a 4 hr job interview this morning. He is on olmesartan for HTN. Reviewed November lab work showing anemia.      RHEUMATOLOGY REVIEW OF SYSTEMS   Positives as per history  Negatives as follows:  Matt Ross:  Denies unexplained persistent fevers, weight change, chronic fatigue  HEAD/EYES:   Denies eye redness, blurry vision or sudden loss of vision, dry eyes, HA, temporal artery pain  ENT:    Denies oral/nasal ulcers, recurrent sinus infections, dry mouth  RESPIRATORY:  No pleuritic pain, history of pleural effusions, hemoptysis, exertional dyspnea  CARDIOVASCULAR:  Denies chest pain, history of pericardial effusions  GASTRO:   Denies heartburn, esophageal dysmotility, abdominal pain, nausea, vomiting, diarrhea, blood in the stool  HEMATOLOGIC:  No easy bruising, purpura, swollen lymph nodes  SKIN:    Denies alopecia, ulcers, nodules, sun sensitivity, unexplained persistent rash   VASCULAR:   Denies edema, cyanosis, raynaud phenomenon  NEUROLOGIC:  Denies specific muscle weakness, paresthesias PSYCHIATRIC:  No sleep disturbance / snoring, depression, anxiety  MSK:    No morning stiffness >1 hour, SI joint pain, persistent joint swelling    PAST MEDICAL HISTORY  Reviewed with patient, significant changes in medical history - no     PHYSICAL EXAM  Blood pressure (!) 144/85, pulse 96, temperature 97.9 °F (36.6 °C), temperature source Oral, resp. rate 16, weight 185 lb (83.9 kg), SpO2 98 %. GENERAL APPEARANCE: Well-nourished, no acute distress  EYES: No scleral erythema, conjunctival injection  ENT: No oral ulcer, parotid enlargement  NECK: No adenopathy, thyroid enlargement  CARDIOVASCULAR: Heart rhythm is regular. No murmur, rub, gallop  CHEST: Normal vesicular breath sounds. No wheezes, rales, pleural friction rubs  ABDOMINAL: The abdomen is soft and nontender. Bowel sounds are normal  EXTREMITIES: There is no evidence of clubbing, cyanosis, edema  SKIN: No rash, palpable purpura, digital ulcer, abnormal thickening, normal nailfold capillaries   NEUROLOGICAL: Normal gait and station, full strength in upper and lower extremities,  normal sensation to light touch  MUSCULOSKELETAL:   (Right/Left)  Hands: thumb dactylitis (present, but now a chronic change); Left 3rd digit joint contracture. Left 2nd joint contracture, fusion of the left 2nd DIP. Elbows: no synovitis   Hips: Decreased ROM of the right/NA   Knees: right knee dROM/NA  Ankles: bilateral DROM on inversion and eversion with synovial thickening (unchanged). Left side has synovial thickening, worse than the right side. Feet: MTP decreased ROM with no tenderness (unchanged)     LABS, RADIOLOGY AND PROCEDURES  Previous labs reviewed -Yes  Previous radiology reviewed -Yes    03/06/2019 XR FOOT RT MIN 3 V  IMPRESSION:   1. Findings compatible with psoriatic arthritis with pencil in cup erosive  change of the second through fifth MTP joints and multiple joint ankylosis. 03/06/2019 XR FOOT LT MIN 3 V  IMPRESSION:   1.  Pencil in cup deformities and ankylosis of multiple joints as detailed above  compatible with psoriatic arthritis. Previous procedures reviewed -Yes  Previous medical records reviewed/summarized -Yes    ASSESSMENT  1. PsA (1998) -(is unchanged)- The patient's disease remains well controlled on current medication. He will continue Enbrel 50 mg weekly. Recent lab work shows subtle anemia so we will do anemia workup with his next set of labs. I ordered lab work to be done in 2 months. 2. Drug therapy monitoring for toxicity (biologic) - CBC, BUN, Cr, AST, ALT and albumin every 4 months    PLAN  1. Enbrel 50 mg pen weekly  2. Check CBC, CMP  3. Check iron panel, reticulocyte count, folic acid and L36    4. Return in 5 months    Shaggy Tan MD  Adult and Pediatric Rheumatology     [facility], [address] [place] [address], Phone [phone], Fax [phone]    Visiting  of Pediatrics    Department of Pediatrics, [facility] of [address] [place] [address], [address] [place] [address], Phone [phone], Fax [phone]    There are no Patient Instructions on file for this visit. cc:  Kennedy Sequeira MD    I, Clau Green MD, personally performed the services described in the documentation as scribed by Tessie Michael in my presence and have reviewed and agree with the note as scribed.

## 2023-03-14 NOTE — PROGRESS NOTES
Chief Complaint   Patient presents with    Joint Pain     1. Have you been to the ER, urgent care clinic since your last visit? Hospitalized since your last visit? No    2. Have you seen or consulted any other health care providers outside of the [address] [place] [address] since your last visit? Include any pap smears or colon screening.  No

## 2023-04-22 NOTE — PROGRESS NOTES
[facility] at [address] [place] [address] Update    Date: 04/29/22    Efe Avelar 1980     Medication: Enbrel 50mg     Prior Authorization:04/28/22-04/28/2023  Prescription needs to be transferred to [facility] (phone: [phone]). Ulices Maria Luisa ([phone]) was notified that this specialty prescription needs to be transferred to the insurance mandated specialty pharmacy above.      [address] [place] [address],  Nahomy Mayes   [address] [place] [address]  phone: [phone]   fax: [phone]
Patient/Caregiver provided printed discharge information.

## 2023-05-19 RX ORDER — OLMESARTAN MEDOXOMIL 20 MG/1
20 TABLET ORAL DAILY
COMMUNITY

## 2023-05-19 RX ORDER — SEMAGLUTIDE 1.34 MG/ML
INJECTION, SOLUTION SUBCUTANEOUS
COMMUNITY
Start: 2021-10-04

## 2023-05-19 RX ORDER — MEDROXYPROGESTERONE ACETATE 150 MG/ML
INJECTION, SUSPENSION INTRAMUSCULAR
COMMUNITY
Start: 2022-11-28 | End: 2023-05-24 | Stop reason: SDUPTHER

## 2023-05-19 RX ORDER — ROSUVASTATIN CALCIUM 10 MG/1
TABLET, COATED ORAL
COMMUNITY
Start: 2022-10-01

## 2023-05-23 DIAGNOSIS — L40.50 ARTHROPATHIC PSORIASIS, UNSPECIFIED (HCC): Primary | ICD-10-CM

## 2023-05-23 RX ORDER — MEDROXYPROGESTERONE ACETATE 150 MG/ML
INJECTION, SUSPENSION INTRAMUSCULAR
Qty: 4.2 ML | Refills: 3 | Status: CANCELLED | OUTPATIENT
Start: 2023-05-23

## 2023-05-23 NOTE — TELEPHONE ENCOUNTER
PT called and stated that they need a refill for Enbrel but a PA is required, PT is requesting a call back from [address] or his nurse

## 2023-05-24 RX ORDER — MEDROXYPROGESTERONE ACETATE 150 MG/ML
INJECTION, SUSPENSION INTRAMUSCULAR
Qty: 4 ML | Refills: 3 | Status: ACTIVE | OUTPATIENT
Start: 2023-05-24

## 2023-05-24 RX ORDER — MEDROXYPROGESTERONE ACETATE 150 MG/ML
INJECTION, SUSPENSION INTRAMUSCULAR
Qty: 4 ML | Refills: 3 | Status: CANCELLED | OUTPATIENT
Start: 2023-05-24

## 2023-05-24 NOTE — TELEPHONE ENCOUNTER
Sent [employer] message to patient along with lab order stating patient needs to get lab work done for PA processing.

## 2023-05-24 NOTE — TELEPHONE ENCOUNTER
PT called regarding last encounter, I informed him per Tisha Perez that labs were needed and PT stated that he got his labs done through his PCP Juan Luis Xiong stated he'd take care of it. I informed the PT per Yakelin that they'd get the PA sent and discuss the labs from the PCP stated he understood.

## 2023-05-26 NOTE — PROGRESS NOTES
[facility] [facility] [place]    Date: 05/26/23    Enbrel was routed to the mandated specialty pharmacy [facility]. Prior authorization has been approved through 5-. Pharmacy will inform patient and provide them with dispensing pharmacy's phone number. Please call us with any questions at  [phone].

## 2023-06-08 RX ORDER — MEDROXYPROGESTERONE ACETATE 150 MG/ML
INJECTION, SUSPENSION INTRAMUSCULAR
Qty: 4 ML | Refills: 3 | Status: ACTIVE | OUTPATIENT
Start: 2023-06-08

## 2023-06-08 NOTE — PROGRESS NOTES
[facility] [place] [facility]    Date: 06/08/23    Enbrel was routed to the mandated specialty pharmacy [facility]. Prior authorization has been approved through 5-. Pharmacy will inform patient and provide them with dispensing pharmacy's phone number. Please call us with any questions at  [phone].

## 2023-08-15 ENCOUNTER — OFFICE VISIT (OUTPATIENT)
Age: 43
End: 2023-08-15
Payer: COMMERCIAL

## 2023-08-15 VITALS
RESPIRATION RATE: 16 BRPM | BODY MASS INDEX: 28.66 KG/M2 | HEART RATE: 99 BPM | SYSTOLIC BLOOD PRESSURE: 167 MMHG | OXYGEN SATURATION: 97 % | DIASTOLIC BLOOD PRESSURE: 97 MMHG | TEMPERATURE: 98 F | WEIGHT: 183 LBS

## 2023-08-15 DIAGNOSIS — L40.50 ARTHROPATHIC PSORIASIS, UNSPECIFIED (HCC): Primary | ICD-10-CM

## 2023-08-15 PROCEDURE — 99214 OFFICE O/P EST MOD 30 MIN: CPT | Performed by: PEDIATRICS

## 2023-08-15 RX ORDER — LANOLIN ALCOHOL/MO/W.PET/CERES
1000 CREAM (GRAM) TOPICAL DAILY
COMMUNITY

## 2023-08-15 RX ORDER — FERROUS SULFATE 325(65) MG
325 TABLET ORAL
COMMUNITY

## 2023-08-15 RX ORDER — CHOLECALCIFEROL (VITAMIN D3) 50 MCG
2000 TABLET ORAL DAILY
COMMUNITY

## 2023-08-15 ASSESSMENT — PATIENT HEALTH QUESTIONNAIRE - PHQ9
SUM OF ALL RESPONSES TO PHQ QUESTIONS 1-9: 0
2. FEELING DOWN, DEPRESSED OR HOPELESS: 0
SUM OF ALL RESPONSES TO PHQ QUESTIONS 1-9: 0
SUM OF ALL RESPONSES TO PHQ9 QUESTIONS 1 & 2: 0
1. LITTLE INTEREST OR PLEASURE IN DOING THINGS: 0

## 2023-08-15 NOTE — PROGRESS NOTES
RHEUMATOLOGY PROBLEM LIST AND CHIEF COMPLAINT  1. PsA (1998) - Polyarthritis, dactylitis, scalp and groin psoriasis, joint contracture, MTX/SZA ([id number]), Enbrel (2003-current, suboptimally treated with enbrel every 4 weeks)     Therapy History:   Current DMARDs: Enbrel ([id number] every 4 weeks, weekly 3/2019-current, non adherent to weekly medication, noted hold for 3 months from 11/2021-2/2022)    TB  (Q-gold) test: 4/2021 - negative    INTERVAL HISTORY  This is a 37 y.o.  male. Today, the patient complains of no pain in the joints. Location: generalized  Severity: 0 on a scale of 0-10   Timing: none  Duration: 5 months   Modifying factors: Enbrel  Context/Associated signs and symptoms: The patient has been doing well. He continues on Enbrel 50 mg weekly. He denies any significant joint symptoms and has not flared since his last visit. He mentions getting random rashes mainly on his lower legs. The rashes will get dry and resolve on their own. His BP today is elevated at 167/97. He is on olmesartan for HTN. He does not check his BP at home. Reviewed March 2023 lab work including low iron (34), high TIBC (531), low vitamin b12 (154), and low ferritin (9). He was put on vitamin d3, vitamin b12, and iron by his PCP.      RHEUMATOLOGY REVIEW OF SYSTEMS   Positives as per history  Negatives as follows:  Pedro Glover:  Denies unexplained persistent fevers, weight change, chronic fatigue  HEAD/EYES:   Denies eye redness, blurry vision or sudden loss of vision, dry eyes, HA, temporal artery pain  ENT:    Denies oral/nasal ulcers, recurrent sinus infections, dry mouth  RESPIRATORY:  No pleuritic pain, history of pleural effusions, hemoptysis, exertional dyspnea  CARDIOVASCULAR:  Denies chest pain, history of pericardial effusions  GASTRO:   Denies heartburn, esophageal dysmotility, abdominal pain, nausea, vomiting, diarrhea, blood in the stool  HEMATOLOGIC:  No easy bruising, purpura, swollen lymph

## 2023-08-15 NOTE — PROGRESS NOTES
Chief Complaint   Patient presents with    Joint Pain     1. Have you been to the ER, urgent care clinic since your last visit? Hospitalized since your last visit? No    2. Have you seen or consulted any other health care providers outside of the [address] [place] [address] since your last visit? Include any pap smears or colon screening.  No

## 2023-10-11 ENCOUNTER — TELEPHONE (OUTPATIENT)
Age: 43
End: 2023-10-11

## 2023-10-11 NOTE — TELEPHONE ENCOUNTER
PT called and stated that one of their Enbrel's didn't work and they'll be sending a new one and [facility] or [facility] will contact the office about a replacement

## 2023-10-13 NOTE — TELEPHONE ENCOUNTER
Spoke to Yohan [facility] pharmacist gave a verbal for a Enbrel replacement, Yohan verbally acknowledged understanding

## 2023-12-13 ENCOUNTER — TELEPHONE (OUTPATIENT)
Age: 43
End: 2023-12-13

## 2023-12-13 DIAGNOSIS — L40.50 ARTHROPATHIC PSORIASIS, UNSPECIFIED (HCC): Primary | ICD-10-CM

## 2023-12-13 NOTE — TELEPHONE ENCOUNTER
Called the pt and left him a vm letting him know that we were trying to reach him because the doctor wanted him to have some labs done as soon as possible and also for us to make him an appt some time in Feb for him to come in for an office visit. I asked him to give us a call back when he got this msg.

## 2023-12-15 RX ORDER — MEDROXYPROGESTERONE ACETATE 150 MG/ML
INJECTION, SUSPENSION INTRAMUSCULAR
Qty: 4 ML | Refills: 0 | Status: ACTIVE | OUTPATIENT
Start: 2023-12-15

## 2023-12-15 NOTE — PROGRESS NOTES
[address] Update    Date: 12/15/23    Enbrel was routed to the mandated specialty pharmacy [facility]. Prior authorization has been approved through 5-. If you have any questions, please call us at [phone].

## 2024-01-11 LAB
ALBUMIN SERPL-MCNC: 4.2 G/DL (ref 4.1–5.1)
ALBUMIN/GLOB SERPL: 1.2 {RATIO} (ref 1.2–2.2)
ALP SERPL-CCNC: 84 IU/L (ref 44–121)
ALT SERPL-CCNC: 82 IU/L (ref 0–44)
AST SERPL-CCNC: 92 IU/L (ref 0–40)
BASOPHILS # BLD AUTO: 0.1 X10E3/UL (ref 0–0.2)
BASOPHILS NFR BLD AUTO: 2 %
BILIRUB SERPL-MCNC: 0.9 MG/DL (ref 0–1.2)
BUN SERPL-MCNC: 8 MG/DL (ref 6–24)
BUN/CREAT SERPL: 10 (ref 9–20)
CALCIUM SERPL-MCNC: 9.2 MG/DL (ref 8.7–10.2)
CHLORIDE SERPL-SCNC: 100 MMOL/L (ref 96–106)
CO2 SERPL-SCNC: 23 MMOL/L (ref 20–29)
CREAT SERPL-MCNC: 0.82 MG/DL (ref 0.76–1.27)
EGFRCR SERPLBLD CKD-EPI 2021: 110 ML/MIN/1.73
EOSINOPHIL # BLD AUTO: 0.1 X10E3/UL (ref 0–0.4)
EOSINOPHIL NFR BLD AUTO: 2 %
ERYTHROCYTE [DISTWIDTH] IN BLOOD BY AUTOMATED COUNT: 13.1 % (ref 11.6–15.4)
GLOBULIN SER CALC-MCNC: 3.5 G/DL (ref 1.5–4.5)
GLUCOSE SERPL-MCNC: 292 MG/DL (ref 70–99)
HCT VFR BLD AUTO: 37 % (ref 37.5–51)
HGB BLD-MCNC: 12.5 G/DL (ref 13–17.7)
IMM GRANULOCYTES # BLD AUTO: 0 X10E3/UL (ref 0–0.1)
IMM GRANULOCYTES NFR BLD AUTO: 0 %
LYMPHOCYTES # BLD AUTO: 1.8 X10E3/UL (ref 0.7–3.1)
LYMPHOCYTES NFR BLD AUTO: 47 %
MCH RBC QN AUTO: 28.6 PG (ref 26.6–33)
MCHC RBC AUTO-ENTMCNC: 33.8 G/DL (ref 31.5–35.7)
MCV RBC AUTO: 85 FL (ref 79–97)
MONOCYTES # BLD AUTO: 0.3 X10E3/UL (ref 0.1–0.9)
MONOCYTES NFR BLD AUTO: 7 %
NEUTROPHILS # BLD AUTO: 1.6 X10E3/UL (ref 1.4–7)
NEUTROPHILS NFR BLD AUTO: 42 %
PLATELET # BLD AUTO: 171 X10E3/UL (ref 150–450)
POTASSIUM SERPL-SCNC: 4.7 MMOL/L (ref 3.5–5.2)
PROT SERPL-MCNC: 7.7 G/DL (ref 6–8.5)
RBC # BLD AUTO: 4.37 X10E6/UL (ref 4.14–5.8)
SODIUM SERPL-SCNC: 137 MMOL/L (ref 134–144)
WBC # BLD AUTO: 3.7 X10E3/UL (ref 3.4–10.8)

## 2024-02-05 ENCOUNTER — OFFICE VISIT (OUTPATIENT)
Age: 44
End: 2024-02-05
Payer: COMMERCIAL

## 2024-02-05 VITALS
WEIGHT: 180 LBS | TEMPERATURE: 98 F | RESPIRATION RATE: 16 BRPM | OXYGEN SATURATION: 97 % | HEART RATE: 86 BPM | BODY MASS INDEX: 28.19 KG/M2 | SYSTOLIC BLOOD PRESSURE: 164 MMHG | DIASTOLIC BLOOD PRESSURE: 91 MMHG

## 2024-02-05 DIAGNOSIS — L40.50 ARTHROPATHIC PSORIASIS, UNSPECIFIED (HCC): Primary | ICD-10-CM

## 2024-02-05 DIAGNOSIS — R79.89 OTHER SPECIFIED ABNORMAL FINDINGS OF BLOOD CHEMISTRY: ICD-10-CM

## 2024-02-05 PROCEDURE — 99214 OFFICE O/P EST MOD 30 MIN: CPT | Performed by: PEDIATRICS

## 2024-02-05 RX ORDER — MEDROXYPROGESTERONE ACETATE 150 MG/ML
50 INJECTION, SUSPENSION INTRAMUSCULAR WEEKLY
Qty: 4 ML | Refills: 3 | Status: ACTIVE | OUTPATIENT
Start: 2024-02-05

## 2024-02-05 ASSESSMENT — PATIENT HEALTH QUESTIONNAIRE - PHQ9
2. FEELING DOWN, DEPRESSED OR HOPELESS: 0
SUM OF ALL RESPONSES TO PHQ QUESTIONS 1-9: 0
SUM OF ALL RESPONSES TO PHQ9 QUESTIONS 1 & 2: 0
SUM OF ALL RESPONSES TO PHQ QUESTIONS 1-9: 0
1. LITTLE INTEREST OR PLEASURE IN DOING THINGS: 0
SUM OF ALL RESPONSES TO PHQ QUESTIONS 1-9: 0
SUM OF ALL RESPONSES TO PHQ QUESTIONS 1-9: 0

## 2024-02-05 NOTE — PROGRESS NOTES
Chief Complaint   Patient presents with    Joint Pain     1. Have you been to the ER, urgent care clinic since your last visit?  Hospitalized since your last visit?No    2. Have you seen or consulted any other health care providers outside of the [facility] since your last visit?  Include any pap smears or colon screening. No

## 2024-02-05 NOTE — PROGRESS NOTES
RHEUMATOLOGY PROBLEM LIST AND CHIEF COMPLAINT  1. PsA (1998) - Polyarthritis, dactylitis, scalp and groin psoriasis, joint contracture, MTX/SZA ([id number]), Enbrel (2003-current, suboptimally treated with enbrel every 4 weeks)     Therapy History:   Current DMARDs: Enbrel ([id number] every 4 weeks, weekly 3/2019-current, non adherent to weekly medication, noted hold for 3 months from 11/2021-2/2022)    TB  (Q-gold) test: 4/2021 - negative    INTERVAL HISTORY  This is a 43 y.o.  male.  Today, the patient complains of pain in the joints.  Location: generalized  Severity: 4 on a scale of 0-10   Timing: intermittent  Duration: 4 months   Modifying factors: Enbrel  Context/Associated signs and symptoms: The patient has been doing well. He continues on Enbrel 50 mg weekly. He denies any significant joint symptoms. His BP today is elevated at 164/91. He is on olmesartan for HTN.     RHEUMATOLOGY REVIEW OF SYSTEMS   Positives as per history  Negatives as follows:  CONSTITUTlONAL:  Denies unexplained persistent fevers, weight change, chronic fatigue  HEAD/EYES:   Denies eye redness, blurry vision or sudden loss of vision, dry eyes, HA, temporal artery pain  ENT:    Denies oral/nasal ulcers, recurrent sinus infections, dry mouth  RESPIRATORY:  No pleuritic pain, history of pleural effusions, hemoptysis, exertional dyspnea  CARDIOVASCULAR:  Denies chest pain, history of pericardial effusions  GASTRO:   Denies heartburn, esophageal dysmotility, abdominal pain, nausea, vomiting, diarrhea, blood in the stool  HEMATOLOGIC:  No easy bruising, purpura, swollen lymph nodes  SKIN:    Denies alopecia, ulcers, nodules, sun sensitivity, unexplained persistent rash   VASCULAR:   Denies edema, cyanosis, raynaud phenomenon  NEUROLOGIC:  Denies specific muscle weakness, paresthesias   PSYCHIATRIC:  No sleep disturbance / snoring, depression, anxiety  MSK:    No morning stiffness >1 hour, SI joint pain, persistent joint

## 2024-02-07 RX ORDER — MEDROXYPROGESTERONE ACETATE 150 MG/ML
INJECTION, SUSPENSION INTRAMUSCULAR
Refills: 0 | OUTPATIENT
Start: 2024-02-07

## 2024-06-05 DIAGNOSIS — L40.50 ARTHROPATHIC PSORIASIS, UNSPECIFIED (HCC): Primary | ICD-10-CM

## 2024-06-05 NOTE — TELEPHONE ENCOUNTER
Last visit 02/05/24  Lab Results   Component Value Date     01/10/2024    K 4.7 01/10/2024     01/10/2024    CO2 23 01/10/2024    BUN 8 01/10/2024    CREATININE 0.82 01/10/2024    GLUCOSE 292 (H) 01/10/2024    CALCIUM 9.2 01/10/2024    BILITOT 0.9 01/10/2024    ALKPHOS 84 01/10/2024    AST 92 (H) 01/10/2024    ALT 82 (H) 01/10/2024    LABGLOM 110 01/10/2024    GFRAA 126 02/04/2022    AGRATIO 1.2 01/10/2024     Lab Results   Component Value Date    WBC 3.7 01/10/2024    HGB 12.5 (L) 01/10/2024    HCT 37.0 (L) 01/10/2024    MCV 85 01/10/2024     01/10/2024

## 2024-06-07 RX ORDER — MEDROXYPROGESTERONE ACETATE 150 MG/ML
50 INJECTION, SUSPENSION INTRAMUSCULAR WEEKLY
Qty: 4 ML | Refills: 3 | OUTPATIENT
Start: 2024-06-07

## 2024-06-07 NOTE — TELEPHONE ENCOUNTER
Reached out to patient via [employer] and attached the lab orders to patient's chart.     Also advised that if the order can't be printed, to give office a call when he plans on getting the labs done so the orders can be faxed.

## 2024-10-01 ENCOUNTER — OFFICE VISIT (OUTPATIENT)
Age: 44
End: 2024-10-01
Payer: COMMERCIAL

## 2024-10-01 VITALS
SYSTOLIC BLOOD PRESSURE: 112 MMHG | WEIGHT: 167 LBS | DIASTOLIC BLOOD PRESSURE: 72 MMHG | RESPIRATION RATE: 16 BRPM | OXYGEN SATURATION: 99 % | TEMPERATURE: 97.8 F | HEART RATE: 66 BPM | BODY MASS INDEX: 26.16 KG/M2

## 2024-10-01 DIAGNOSIS — L40.50 ARTHROPATHIC PSORIASIS, UNSPECIFIED (HCC): Primary | ICD-10-CM

## 2024-10-01 PROCEDURE — 99214 OFFICE O/P EST MOD 30 MIN: CPT | Performed by: PEDIATRICS

## 2024-10-01 RX ORDER — OLMESARTAN MEDOXOMIL AND HYDROCHLOROTHIAZIDE 40/12.5 40; 12.5 MG/1; MG/1
1 TABLET ORAL
COMMUNITY
Start: 2024-08-30 | End: 2024-11-28

## 2024-10-01 RX ORDER — METFORMIN HCL 500 MG
TABLET, EXTENDED RELEASE 24 HR ORAL
COMMUNITY
Start: 2024-08-20

## 2024-10-01 RX ORDER — ESCITALOPRAM OXALATE 5 MG/1
5 TABLET ORAL EVERY MORNING
COMMUNITY
Start: 2024-08-30

## 2024-10-01 ASSESSMENT — PATIENT HEALTH QUESTIONNAIRE - PHQ9
SUM OF ALL RESPONSES TO PHQ QUESTIONS 1-9: 0
SUM OF ALL RESPONSES TO PHQ QUESTIONS 1-9: 0
SUM OF ALL RESPONSES TO PHQ9 QUESTIONS 1 & 2: 0
SUM OF ALL RESPONSES TO PHQ QUESTIONS 1-9: 0
2. FEELING DOWN, DEPRESSED OR HOPELESS: NOT AT ALL
SUM OF ALL RESPONSES TO PHQ QUESTIONS 1-9: 0
1. LITTLE INTEREST OR PLEASURE IN DOING THINGS: NOT AT ALL

## 2024-10-01 NOTE — PROGRESS NOTES
Chief Complaint   Patient presents with    Joint Pain     1. Have you been to the ER, urgent care clinic since your last visit?  Hospitalized since your last visit?No    2. Have you seen or consulted any other health care providers outside of the [facility] since your last visit?  Include any pap smears or colon screening. No

## 2024-10-01 NOTE — PROGRESS NOTES
RHEUMATOLOGY PROBLEM LIST AND CHIEF COMPLAINT  1. PsA (1998) - Polyarthritis, dactylitis, scalp and groin psoriasis, joint contracture, MTX/SZA ([id number]), Enbrel (2003-current, suboptimally treated with enbrel every 4 weeks)     Therapy History:   Prior DMARDs: Enbrel ([id number] every 4 weeks, weekly 3/2019-6/2024, non adherent to weekly medication, noted hold for 3 months from 11/2021-2/2022)    TB  (Q-gold) test: 4/2021 - negative    INTERVAL HISTORY  This is a 44 y.o.  male.  Today, the patient complains of pain in the joints.  Location: generalized  Severity: 2 on a scale of 0-10   Timing: intermittent  Duration: 8 months   Modifying factors: Enbrel  Context/Associated signs and symptoms: The patient is on Enbrel 50 mg weekly, but has been off this for about 4 months. He was recently diagnosed with diabetes and started on metformin and Ozempic and was focusing on diabetes treatment so he did not refill Enbrel. He has been doing well off Enbrel and denies any persistent joint pain, joint swell, morning stiffness, rash. He has some knee pain when walking up stairs.      RHEUMATOLOGY REVIEW OF SYSTEMS   Positives as per history  Negatives as follows:  CONSTITUTlONAL:  Denies unexplained persistent fevers, weight change, chronic fatigue  HEAD/EYES:   Denies eye redness, blurry vision or sudden loss of vision, dry eyes, HA, temporal artery pain  ENT:    Denies oral/nasal ulcers, recurrent sinus infections, dry mouth  RESPIRATORY:  No pleuritic pain, history of pleural effusions, hemoptysis, exertional dyspnea  CARDIOVASCULAR:  Denies chest pain, history of pericardial effusions  GASTRO:   Denies heartburn, esophageal dysmotility, abdominal pain, nausea, vomiting, diarrhea, blood in the stool  HEMATOLOGIC:  No easy bruising, purpura, swollen lymph nodes  SKIN:    Denies alopecia, ulcers, nodules, sun sensitivity, unexplained persistent rash   VASCULAR:   Denies edema, cyanosis, raynaud

## 2024-11-08 ENCOUNTER — TELEPHONE (OUTPATIENT)
Age: 44
End: 2024-11-08

## 2024-11-08 NOTE — TELEPHONE ENCOUNTER
Called PT and informed  will be leaving on 12/31/24. Informed that appt in April would need to be canceled due to this and the PT stated he understood. Informed that a [employer] message was sent on 10/25/24  and that a letter was also sent out with office names and numbers provided by . PT stated he understood

## 2025-04-11 ENCOUNTER — TELEPHONE (OUTPATIENT)
Age: 45
End: 2025-04-11

## 2025-05-18 ENCOUNTER — APPOINTMENT (OUTPATIENT)
Facility: HOSPITAL | Age: 45
End: 2025-05-18
Payer: COMMERCIAL

## 2025-05-18 ENCOUNTER — HOSPITAL ENCOUNTER (EMERGENCY)
Facility: HOSPITAL | Age: 45
Discharge: HOME | End: 2025-05-18
Attending: STUDENT IN AN ORGANIZED HEALTH CARE EDUCATION/TRAINING PROGRAM
Payer: COMMERCIAL

## 2025-05-18 VITALS
BODY MASS INDEX: 26.21 KG/M2 | RESPIRATION RATE: 16 BRPM | TEMPERATURE: 99.1 F | DIASTOLIC BLOOD PRESSURE: 79 MMHG | HEART RATE: 81 BPM | HEIGHT: 67 IN | WEIGHT: 167 LBS | SYSTOLIC BLOOD PRESSURE: 138 MMHG | OXYGEN SATURATION: 97 %

## 2025-05-18 DIAGNOSIS — K62.89 RECTAL PAIN: Primary | ICD-10-CM

## 2025-05-18 LAB
ALBUMIN SERPL-MCNC: 3.3 G/DL (ref 3.5–5)
ALBUMIN/GLOB SERPL: 0.8 (ref 1.1–2.2)
ALP SERPL-CCNC: 88 U/L (ref 45–117)
ALT SERPL-CCNC: 54 U/L (ref 12–78)
ANION GAP SERPL CALC-SCNC: 6 MMOL/L (ref 2–12)
AST SERPL-CCNC: 41 U/L (ref 15–37)
BASOPHILS # BLD: 0.03 K/UL (ref 0–0.1)
BASOPHILS NFR BLD: 0.7 % (ref 0–1)
BILIRUB SERPL-MCNC: 1.9 MG/DL (ref 0.2–1)
BUN SERPL-MCNC: 12 MG/DL (ref 6–20)
BUN/CREAT SERPL: 11 (ref 12–20)
CALCIUM SERPL-MCNC: 9.2 MG/DL (ref 8.5–10.1)
CHLORIDE SERPL-SCNC: 103 MMOL/L (ref 97–108)
CO2 SERPL-SCNC: 28 MMOL/L (ref 21–32)
CREAT SERPL-MCNC: 1.14 MG/DL (ref 0.7–1.3)
DIFFERENTIAL METHOD BLD: ABNORMAL
EOSINOPHIL # BLD: 0.08 K/UL (ref 0–0.4)
EOSINOPHIL NFR BLD: 1.8 % (ref 0–7)
ERYTHROCYTE [DISTWIDTH] IN BLOOD BY AUTOMATED COUNT: 12.7 % (ref 11.5–14.5)
GLOBULIN SER CALC-MCNC: 4.2 G/DL (ref 2–4)
GLUCOSE SERPL-MCNC: 176 MG/DL (ref 65–100)
HCT VFR BLD AUTO: 32.1 % (ref 36.6–50.3)
HEMOCCULT STL QL: POSITIVE
HGB BLD-MCNC: 10.6 G/DL (ref 12.1–17)
IMM GRANULOCYTES # BLD AUTO: 0.06 K/UL (ref 0–0.04)
IMM GRANULOCYTES NFR BLD AUTO: 1.3 % (ref 0–0.5)
LYMPHOCYTES # BLD: 1 K/UL (ref 0.8–3.5)
LYMPHOCYTES NFR BLD: 22 % (ref 12–49)
MCH RBC QN AUTO: 28.5 PG (ref 26–34)
MCHC RBC AUTO-ENTMCNC: 33 G/DL (ref 30–36.5)
MCV RBC AUTO: 86.3 FL (ref 80–99)
MONOCYTES # BLD: 0.37 K/UL (ref 0–1)
MONOCYTES NFR BLD: 8.1 % (ref 5–13)
NEUTS SEG # BLD: 3.01 K/UL (ref 1.8–8)
NEUTS SEG NFR BLD: 66.1 % (ref 32–75)
NRBC # BLD: 0 K/UL (ref 0–0.01)
NRBC BLD-RTO: 0 PER 100 WBC
PLATELET # BLD AUTO: 160 K/UL (ref 150–400)
PMV BLD AUTO: 10.9 FL (ref 8.9–12.9)
POTASSIUM SERPL-SCNC: 3.9 MMOL/L (ref 3.5–5.1)
PROT SERPL-MCNC: 7.5 G/DL (ref 6.4–8.2)
RBC # BLD AUTO: 3.72 M/UL (ref 4.1–5.7)
SODIUM SERPL-SCNC: 137 MMOL/L (ref 136–145)
WBC # BLD AUTO: 4.6 K/UL (ref 4.1–11.1)

## 2025-05-18 PROCEDURE — 36415 COLL VENOUS BLD VENIPUNCTURE: CPT

## 2025-05-18 PROCEDURE — 85025 COMPLETE CBC W/AUTO DIFF WBC: CPT

## 2025-05-18 PROCEDURE — 6360000004 HC RX CONTRAST MEDICATION: Performed by: STUDENT IN AN ORGANIZED HEALTH CARE EDUCATION/TRAINING PROGRAM

## 2025-05-18 PROCEDURE — 6360000002 HC RX W HCPCS: Performed by: STUDENT IN AN ORGANIZED HEALTH CARE EDUCATION/TRAINING PROGRAM

## 2025-05-18 PROCEDURE — 74177 CT ABD & PELVIS W/CONTRAST: CPT

## 2025-05-18 PROCEDURE — 96374 THER/PROPH/DIAG INJ IV PUSH: CPT

## 2025-05-18 PROCEDURE — 82272 OCCULT BLD FECES 1-3 TESTS: CPT

## 2025-05-18 PROCEDURE — 99285 EMERGENCY DEPT VISIT HI MDM: CPT

## 2025-05-18 PROCEDURE — 80053 COMPREHEN METABOLIC PANEL: CPT

## 2025-05-18 RX ORDER — KETOROLAC TROMETHAMINE 15 MG/ML
15 INJECTION, SOLUTION INTRAMUSCULAR; INTRAVENOUS
Status: COMPLETED | OUTPATIENT
Start: 2025-05-18 | End: 2025-05-18

## 2025-05-18 RX ORDER — IOPAMIDOL 755 MG/ML
100 INJECTION, SOLUTION INTRAVASCULAR
Status: COMPLETED | OUTPATIENT
Start: 2025-05-18 | End: 2025-05-18

## 2025-05-18 RX ADMIN — KETOROLAC TROMETHAMINE 15 MG: 15 INJECTION, SOLUTION INTRAMUSCULAR; INTRAVENOUS at 21:00

## 2025-05-18 RX ADMIN — IOPAMIDOL 100 ML: 755 INJECTION, SOLUTION INTRAVENOUS at 20:40

## 2025-05-18 ASSESSMENT — PAIN DESCRIPTION - LOCATION: LOCATION: BUTTOCKS

## 2025-05-18 ASSESSMENT — PAIN SCALES - GENERAL: PAINLEVEL_OUTOF10: 5

## 2025-05-18 ASSESSMENT — PAIN - FUNCTIONAL ASSESSMENT: PAIN_FUNCTIONAL_ASSESSMENT: 0-10

## 2025-05-18 NOTE — ED TRIAGE NOTES
Pt arrives with c/o low grade fever at home.  Pt had hemorrhoidectomy on Wednesday and was told by his PCP to come ine.  Pt's temp at home was around 100.5.  Pt endorses pain in his anus.  Pt has been taking tylenol, ibuprofen and taking a sitz bat for the pain.    MD Phan assessing in triage.

## 2025-05-18 NOTE — ED PROVIDER NOTES
[facility] EMERGENCY DEPARTMENT  EMERGENCY DEPARTMENT ENCOUNTER      Pt Name: Sherly Riddle  MRN: [id number]  Birthdate 1980  Date of evaluation: 5/18/2025  Provider: Ashia Phan MD    CHIEF COMPLAINT       Chief Complaint   Patient presents with    Fever         HISTORY OF PRESENT ILLNESS   (Location/Symptom, Timing/Onset, Context/Setting, Quality, Duration, Modifying Factors, Severity)  Note limiting factors.   HPI  46 yo male with hx DM, psoriatic arthritis, HLD,  hypothyroid presenting for chills.   Pt reports hemorrhoidectomy last Wednesday, pt surgery at [facility], Dr Jorge Calixto.   Pt reports fever 100.5F at home last night. Pt reports calling surgeon and told not to worry unless fever greater than 101F.   Pt reports operative site pain constant since procedure but no other symptoms.     Review of External Medical Records:         Nursing Notes were reviewed.      REVIEW OF SYSTEMS    (2-9 systems for level 4, 10 or more for level 5)     Except as noted above the remainder of the review of systems was reviewed and negative.       PAST MEDICAL HISTORY   No past medical history on file.      SURGICAL HISTORY     No past surgical history on file.      CURRENT MEDICATIONS       Previous Medications    ENBREL SURECLICK 50 MG/ML SOAJ    Inject 50 mg into the skin once a week    ESCITALOPRAM (LEXAPRO) 5 MG TABLET    Take 1 tablet by mouth every morning    FERROUS SULFATE (IRON 325) 325 (65 FE) MG TABLET    Take 1 tablet by mouth daily (with breakfast)    METFORMIN (GLUCOPHAGE-XR) 500 MG EXTENDED RELEASE TABLET    4 TABLETS WITH EVENING MEAL ORALLY ONCE A DAY 90 DAYS    OLMESARTAN (BENICAR) 20 MG TABLET    Take 1 tablet by mouth daily    OLMESARTAN-HYDROCHLOROTHIAZIDE (BENICAR HCT) 40-12.5 MG PER TABLET    Take 1 tablet by mouth    ROSUVASTATIN (CRESTOR) 10 MG TABLET    ceived the following from [facility]: Outside name: rosuvastatin (CRESTOR) 10 mg tablet

## 2025-05-19 NOTE — ED NOTES
Discharge instructions reviewed w/ the pt. Pt verbalized understanding of these instructions and all questions were answered by the time of discharge.     Pt ambulatory at the time of discharge w/ no difficulties.